# Patient Record
Sex: FEMALE | Race: WHITE | NOT HISPANIC OR LATINO | Employment: FULL TIME | URBAN - METROPOLITAN AREA
[De-identification: names, ages, dates, MRNs, and addresses within clinical notes are randomized per-mention and may not be internally consistent; named-entity substitution may affect disease eponyms.]

---

## 2017-06-19 ENCOUNTER — ALLSCRIPTS OFFICE VISIT (OUTPATIENT)
Dept: OTHER | Facility: OTHER | Age: 27
End: 2017-06-19

## 2017-07-25 ENCOUNTER — ALLSCRIPTS OFFICE VISIT (OUTPATIENT)
Dept: OTHER | Facility: OTHER | Age: 27
End: 2017-07-25

## 2017-08-15 ENCOUNTER — ALLSCRIPTS OFFICE VISIT (OUTPATIENT)
Dept: OTHER | Facility: OTHER | Age: 27
End: 2017-08-15

## 2017-12-26 ENCOUNTER — LAB REQUISITION (OUTPATIENT)
Dept: LAB | Facility: HOSPITAL | Age: 27
End: 2017-12-26
Payer: COMMERCIAL

## 2017-12-26 ENCOUNTER — ALLSCRIPTS OFFICE VISIT (OUTPATIENT)
Dept: OTHER | Facility: OTHER | Age: 27
End: 2017-12-26

## 2017-12-26 DIAGNOSIS — J02.9 ACUTE PHARYNGITIS: ICD-10-CM

## 2017-12-26 LAB — S PYO AG THROAT QL: NEGATIVE

## 2017-12-26 PROCEDURE — 87070 CULTURE OTHR SPECIMN AEROBIC: CPT | Performed by: PHYSICIAN ASSISTANT

## 2017-12-28 LAB — BACTERIA THROAT CULT: NORMAL

## 2018-01-12 VITALS
SYSTOLIC BLOOD PRESSURE: 110 MMHG | WEIGHT: 189 LBS | DIASTOLIC BLOOD PRESSURE: 70 MMHG | TEMPERATURE: 98 F | OXYGEN SATURATION: 100 % | HEART RATE: 88 BPM | BODY MASS INDEX: 32.27 KG/M2 | RESPIRATION RATE: 18 BRPM | HEIGHT: 64 IN

## 2018-01-13 VITALS
HEIGHT: 64 IN | SYSTOLIC BLOOD PRESSURE: 136 MMHG | WEIGHT: 191.38 LBS | DIASTOLIC BLOOD PRESSURE: 78 MMHG | BODY MASS INDEX: 32.67 KG/M2

## 2018-01-13 VITALS
SYSTOLIC BLOOD PRESSURE: 106 MMHG | WEIGHT: 186.2 LBS | DIASTOLIC BLOOD PRESSURE: 64 MMHG | HEIGHT: 64 IN | BODY MASS INDEX: 31.79 KG/M2

## 2018-01-23 VITALS
RESPIRATION RATE: 18 BRPM | SYSTOLIC BLOOD PRESSURE: 120 MMHG | DIASTOLIC BLOOD PRESSURE: 80 MMHG | HEIGHT: 64 IN | TEMPERATURE: 97.4 F | HEART RATE: 86 BPM | BODY MASS INDEX: 32.28 KG/M2 | OXYGEN SATURATION: 99 % | WEIGHT: 189.06 LBS

## 2018-10-15 ENCOUNTER — OFFICE VISIT (OUTPATIENT)
Dept: GYNECOLOGY | Facility: CLINIC | Age: 28
End: 2018-10-15
Payer: COMMERCIAL

## 2018-10-15 VITALS
RESPIRATION RATE: 16 BRPM | BODY MASS INDEX: 32.74 KG/M2 | DIASTOLIC BLOOD PRESSURE: 72 MMHG | SYSTOLIC BLOOD PRESSURE: 124 MMHG | WEIGHT: 191.8 LBS | HEART RATE: 96 BPM | HEIGHT: 64 IN

## 2018-10-15 DIAGNOSIS — N76.6 ULCERATION OF VULVA: Primary | ICD-10-CM

## 2018-10-15 DIAGNOSIS — N76.2 ACUTE VULVITIS: ICD-10-CM

## 2018-10-15 DIAGNOSIS — N76.6 ULCERATION OF VULVA: ICD-10-CM

## 2018-10-15 DIAGNOSIS — N89.8 LEUKORRHEA: ICD-10-CM

## 2018-10-15 PROCEDURE — 99213 OFFICE O/P EST LOW 20 MIN: CPT | Performed by: NURSE PRACTITIONER

## 2018-10-15 PROCEDURE — 87210 SMEAR WET MOUNT SALINE/INK: CPT | Performed by: NURSE PRACTITIONER

## 2018-10-15 NOTE — PROGRESS NOTES
Assessment/Plan:     Tub soak as needed  Rinse urine from skin  Lidocaine rx sent to pharmacy; use as directed  RTO in 4 days, call with any worsening of symptoms  HSV culture done      Diagnoses and all orders for this visit:    Ulceration of vulva  -     lidocaine (XYLOCAINE) 2 % topical gel; Apply 1 application topically 4 (four) times a day    Acute vulvitis    Leukorrhea  -     POCT wet mount    Other orders  -     levonorgestrel (MIRENA, 52 MG,) 20 MCG/24HR IUD; by Intrauterine route  -     Ibuprofen (IBU PO); Take by mouth              Subjective:      Patient ID: Adams Maldonado is a 34 y o  female  Adams Maldonado is a 34 y o  female who is here today for a problem visit  Started with vulvar discomfort 2 days ago and as of yesterday it worsened and now "looks like I have small cuts" on her vulva  Used a new body wash (Ivory Original Scent) starting one week ago  Psoriasis has worsened over the last 3-4 weeks  No follow up and using a cortisone cream to her psoriasis with some improvement  No follow up with a dermatologist  (Never formally diagnosed ) No monthly menses with Mirena IUD  Adams Maldonado is sexually active with male partner of 7 years  No new products for him  Denies new medications or foods  Light white vaginal discharge that she believes is normal  A cool wash cloth lessens the pain  Tub soaks and sitting aggravates the pain  The following portions of the patient's history were reviewed and updated as appropriate: allergies, current medications, past family history, past medical history, past social history, past surgical history and problem list     Review of Systems   Constitutional: Negative  Gastrointestinal: Negative for constipation, diarrhea, nausea and vomiting  Genitourinary: Positive for genital sores  Negative for difficulty urinating, dyspareunia, dysuria, frequency, menstrual problem, pelvic pain, vaginal bleeding, vaginal discharge and vaginal pain     Skin: Negative  See HPI for vulvar skin description   Neurological: Negative for weakness and light-headedness  Hematological: Negative for adenopathy  Psychiatric/Behavioral: Negative  Objective:      /72 (BP Location: Right arm, Patient Position: Sitting, Cuff Size: Standard)   Pulse 96   Resp 16   Ht 5' 4" (1 626 m)   Wt 87 kg (191 lb 12 8 oz)   BMI 32 92 kg/m²          Physical Exam   Constitutional: She is oriented to person, place, and time  She appears well-developed and well-nourished  HENT:   Mouth/Throat: Oropharynx is clear and moist    Abdominal: Soft  Genitourinary: Vagina normal  Rectal exam shows no external hemorrhoid  No labial fusion  There is rash, tenderness and lesion on the right labia  There is no injury on the right labia  There is rash, tenderness and lesion on the left labia  There is no injury on the left labia  Cervix exhibits no motion tenderness, no discharge and no friability  Genitourinary Comments: Multiple  >15, tender shallow based ulcers noted scattered on labia majora and minora   Musculoskeletal: Normal range of motion  Lymphadenopathy:        Right: No inguinal adenopathy present  Left: No inguinal adenopathy present  Neurological: She is alert and oriented to person, place, and time  Skin: Skin is warm and dry  Psychiatric:   anxious   Nursing note and vitals reviewed

## 2018-10-15 NOTE — TELEPHONE ENCOUNTER
Patient called stating [facility] does not have the medication in stock  She called the [facility] on [facility] in [place], and they do have it  Requesting we send the script there

## 2018-10-15 NOTE — PATIENT INSTRUCTIONS
Tub soak as needed  Rinse urine from skin  Lidocaine rx sent to pharmacy; use as directed  RTO in 4 days, call with any worsening of symptoms  HSV culture done

## 2018-10-18 NOTE — PROGRESS NOTES
Assessment/Plan:   Bathe daily with dove bar soap  Keep vulva clean and avoid touching  Gloves given to apply medication as directed  Will call when culture results are available  Call office with any worsening of symptoms  Rinse urine off vulva post urination  Diagnoses and all orders for this visit:    Acute vulvitis  -     nystatin-triamcinolone (MYCOLOG-II) ointment; Apply topically 2 (two) times a day    Ulceration of vulva              Subjective:      Patient ID: Torey Green is a 34 y o  female  Torey Green is a 34 y o  female who is here today for a problem visit  She was seen here on 10/15/18 for vulvar pain with 15 small shallow based ulcers noted scattered on vulva  Used lidocaine gel as ordered  Waiting on culture results  States vulvar pain has lessened  Slight burning of vulva with voiding but even this pain has lessened  Believes here psoriasis is "acting up  "Not sexually active since this occurred  No fever, abd pain,body aches or sore throat  The following portions of the patient's history were reviewed and updated as appropriate: allergies, current medications, past family history, past medical history, past social history, past surgical history and problem list     Review of Systems   Constitutional: Negative  HENT: Negative for mouth sores, sore throat and trouble swallowing  Gastrointestinal: Negative  Genitourinary: Positive for genital sores  Negative for decreased urine volume, dysuria, frequency, menstrual problem, pelvic pain, vaginal discharge and vaginal pain  Skin: Negative  Neurological: Negative for dizziness and light-headedness  Hematological: Negative for adenopathy  Psychiatric/Behavioral: Negative            Objective:      /68 (BP Location: Right arm, Patient Position: Sitting)   Pulse 77   Ht 5' 4" (1 626 m)   Wt 86 5 kg (190 lb 12 8 oz)   LMP  (LMP Unknown)   BMI 32 75 kg/m²          Physical Exam   Constitutional: She is oriented to person, place, and time  She appears well-developed and well-nourished  Genitourinary: Rectal exam shows no external hemorrhoid  No labial fusion  There is tenderness and lesion on the right labia  There is no rash or injury on the right labia  There is tenderness and lesion on the left labia  There is no rash or injury on the left labia  Genitourinary Comments: Multiple shallow based ulcers scattered on labia majora and minora appear to be healing; 1 cm left groin linear laceration noted; erythema of labia minora, majora and hearts line with distinct line up to mons  Satellite lesions noted on mons  Musculoskeletal: Normal range of motion  Lymphadenopathy:        Right: No inguinal adenopathy present  Left: No inguinal adenopathy present  Neurological: She is alert and oriented to person, place, and time  Skin: Skin is warm and dry  Psychiatric: She has a normal mood and affect  Nursing note and vitals reviewed

## 2018-10-19 ENCOUNTER — OFFICE VISIT (OUTPATIENT)
Dept: GYNECOLOGY | Facility: CLINIC | Age: 28
End: 2018-10-19
Payer: COMMERCIAL

## 2018-10-19 VITALS
BODY MASS INDEX: 32.58 KG/M2 | SYSTOLIC BLOOD PRESSURE: 122 MMHG | WEIGHT: 190.8 LBS | HEART RATE: 77 BPM | HEIGHT: 64 IN | DIASTOLIC BLOOD PRESSURE: 68 MMHG

## 2018-10-19 DIAGNOSIS — N76.6 ULCERATION OF VULVA: ICD-10-CM

## 2018-10-19 DIAGNOSIS — N76.2 ACUTE VULVITIS: Primary | ICD-10-CM

## 2018-10-19 PROCEDURE — 99213 OFFICE O/P EST LOW 20 MIN: CPT | Performed by: NURSE PRACTITIONER

## 2018-10-19 RX ORDER — NYSTATIN AND TRIAMCINOLONE ACETONIDE 100000; 1 [USP'U]/G; MG/G
OINTMENT TOPICAL 2 TIMES DAILY
Qty: 30 G | Refills: 0 | Status: SHIPPED | OUTPATIENT
Start: 2018-10-19 | End: 2018-10-31

## 2018-10-19 NOTE — PATIENT INSTRUCTIONS
Bathe daily with dove bar soap  Keep vulva clean and avoid touching  Gloves given to apply medication as directed  Will call when culture results are available  Call office with any worsening of symptoms  Rinse urine off vulva post urination

## 2018-10-22 ENCOUNTER — OFFICE VISIT (OUTPATIENT)
Dept: GYNECOLOGY | Facility: CLINIC | Age: 28
End: 2018-10-22
Payer: COMMERCIAL

## 2018-10-22 VITALS
WEIGHT: 188.8 LBS | DIASTOLIC BLOOD PRESSURE: 72 MMHG | HEIGHT: 64 IN | SYSTOLIC BLOOD PRESSURE: 134 MMHG | HEART RATE: 114 BPM | RESPIRATION RATE: 16 BRPM | BODY MASS INDEX: 32.23 KG/M2

## 2018-10-22 DIAGNOSIS — Z11.3 ROUTINE SCREENING FOR STI (SEXUALLY TRANSMITTED INFECTION): ICD-10-CM

## 2018-10-22 DIAGNOSIS — Z86.19 HISTORY OF PCR DNA POSITIVE FOR HSV1: Primary | ICD-10-CM

## 2018-10-22 LAB
HSV1 DNA SPEC QL NAA+PROBE: DETECTED
HSV2 DNA SPEC QL NAA+PROBE: NOT DETECTED
VZV DNA SPEC QL NAA+PROBE: NOT DETECTED

## 2018-10-22 PROCEDURE — 99213 OFFICE O/P EST LOW 20 MIN: CPT | Performed by: NURSE PRACTITIONER

## 2018-10-22 RX ORDER — VALACYCLOVIR HYDROCHLORIDE 500 MG/1
500 TABLET, FILM COATED ORAL 2 TIMES DAILY
Qty: 6 TABLET | Refills: 5 | Status: SHIPPED | OUTPATIENT
Start: 2018-10-22 | End: 2018-10-31

## 2018-10-22 NOTE — PATIENT INSTRUCTIONS
HSV-1 has less recurrences then HSV-2  You are positive for type 1  Avoid sex during outbreaks or symptoms  Viral shedding may occur in the absence of an outbreak  Discussed partner notification, episodic and suppressive therapy  Suppressive therapy is needed the last trimester of pregnancy

## 2018-10-22 NOTE — PROGRESS NOTES
Assessment/Plan:     HSV-1 has less recurrences then HSV-2  You are positive for type 1  Avoid sex during outbreaks or symptoms  Viral shedding may occur in the absence of an outbreak  Discussed partner notification, episodic and suppressive therapy  Suppressive therapy is needed the last trimester of pregnancy  Rx given for valtrex  Diagnoses and all orders for this visit:    History of PCR DNA positive for HSV1  -     valACYclovir (VALTREX) 500 mg tablet; Take 1 tablet (500 mg total) by mouth 2 (two) times a day for 3 days    Routine screening for STI (sexually transmitted infection)  -     Hepatitis C antibody; Future  -     HIV 1/2 AG-AB combo; Future  -     RPR; Future              Subjective:      Patient ID: Bree Bustillos is a 34 y o  female  Bree Bustillos is a 34 y o  female who is here today for test results  She presented tot this office with multiple shallow based vulvar ulcer on 10/15/18  She tested positive for HSV1 by PCR testing  Bree Bustillos is sexually active with male partner of 7 years  Monogamous  Her vulva has improved significantly with less pain and resolution of the ulcers  The following portions of the patient's history were reviewed and updated as appropriate: allergies, current medications, past family history, past medical history, past social history, past surgical history and problem list     Review of Systems   Constitutional: Negative  HENT: Negative for sore throat and trouble swallowing  Gastrointestinal: Negative for abdominal pain, constipation, diarrhea, nausea and vomiting  Genitourinary: Negative for dyspareunia, dysuria, genital sores, menstrual problem, pelvic pain, vaginal bleeding, vaginal discharge and vaginal pain  Musculoskeletal: Negative for arthralgias and myalgias  Skin: Negative  Hematological: Negative for adenopathy  Psychiatric/Behavioral: Negative  All other systems reviewed and are negative  Objective:      /72 (BP Location: Right arm, Patient Position: Sitting, Cuff Size: Standard)   Pulse (!) 114   Resp 16   Ht 5' 4" (1 626 m)   Wt 85 6 kg (188 lb 12 8 oz)   LMP  (LMP Unknown)   BMI 32 41 kg/m²          Physical Exam   Constitutional: She is oriented to person, place, and time  Neurological: She is alert and oriented to person, place, and time  Psychiatric: She has a normal mood and affect  Nursing note and vitals reviewed      deferred otherwise

## 2018-10-30 PROBLEM — Z00.00 WELL ADULT EXAM: Status: ACTIVE | Noted: 2018-10-30

## 2018-10-31 ENCOUNTER — OFFICE VISIT (OUTPATIENT)
Dept: FAMILY MEDICINE CLINIC | Facility: CLINIC | Age: 28
End: 2018-10-31
Payer: COMMERCIAL

## 2018-10-31 VITALS
HEIGHT: 64 IN | WEIGHT: 188 LBS | DIASTOLIC BLOOD PRESSURE: 70 MMHG | SYSTOLIC BLOOD PRESSURE: 110 MMHG | OXYGEN SATURATION: 98 % | RESPIRATION RATE: 18 BRPM | TEMPERATURE: 98 F | BODY MASS INDEX: 32.1 KG/M2 | HEART RATE: 88 BPM

## 2018-10-31 DIAGNOSIS — Z00.00 WELL ADULT EXAM: Primary | ICD-10-CM

## 2018-10-31 PROBLEM — N76.2 ACUTE VULVITIS: Status: RESOLVED | Noted: 2018-10-15 | Resolved: 2018-10-31

## 2018-10-31 PROBLEM — N76.6 ULCERATION OF VULVA: Status: RESOLVED | Noted: 2018-10-15 | Resolved: 2018-10-31

## 2018-10-31 PROCEDURE — 99395 PREV VISIT EST AGE 18-39: CPT | Performed by: PHYSICIAN ASSISTANT

## 2018-10-31 PROCEDURE — 3725F SCREEN DEPRESSION PERFORMED: CPT | Performed by: PHYSICIAN ASSISTANT

## 2018-10-31 NOTE — PROGRESS NOTES
Assessment/Plan:    Patient Instructions   School physical form completed  Routine physical in 1 year           Diagnoses and all orders for this visit:    Well adult exam          Subjective:      Patient ID: Sorin Cordova is a 34 y o  female  Patient presents today for routine physical for employment at [employer]  She has no concerns at this time  She does not have her immunization records  She does follow with gynecology routinely  Denies any the medical conditions listed on the school form  The following portions of the patient's history were reviewed and updated as appropriate:   She  has a past medical history of Psoriasis  She   Patient Active Problem List    Diagnosis Date Noted    Well adult exam 10/30/2018    History of PCR DNA positive for HSV1 10/22/2018    Routine screening for STI (sexually transmitted infection) 10/22/2018    Panic attacks 08/06/2014     She  has a past surgical history that includes Cholecystectomy and [place] tooth extraction  Her family history includes Arthritis in her mother; Asthma in her family and mother; Diabetes in her maternal grandmother; Hypertension in her mother; No Known Problems in her brother, brother, daughter, sister, and sister; Other in her maternal grandfather; Thyroid disease unspecified in her maternal grandmother and mother  She  reports that she has quit smoking  She has never used smokeless tobacco  She reports that she drinks alcohol  She reports that she does not use drugs  Current Outpatient Prescriptions   Medication Sig Dispense Refill    levonorgestrel (MIRENA, 52 MG,) 20 MCG/24HR IUD by Intrauterine route       No current facility-administered medications for this visit        Current Outpatient Prescriptions on File Prior to Visit   Medication Sig    levonorgestrel (MIRENA, 52 MG,) 20 MCG/24HR IUD by Intrauterine route    [DISCONTINUED] Ibuprofen (IBU PO) Take by mouth    [DISCONTINUED] nystatin-triamcinolone (MYCOLOG-II) ointment Apply topically 2 (two) times a day    [DISCONTINUED] valACYclovir (VALTREX) 500 mg tablet Take 1 tablet (500 mg total) by mouth 2 (two) times a day for 3 days     No current facility-administered medications on file prior to visit  She has No Known Allergies       Review of Systems      Objective:      /70   Pulse 88   Temp 98 °F (36 7 °C) (Tympanic)   Resp 18   Ht 5' 4" (1 626 m)   Wt 85 3 kg (188 lb)   LMP  (LMP Unknown)   SpO2 98%   BMI 32 27 kg/m²          Physical Exam   Constitutional: She appears well-developed and well-nourished  No distress  HENT:   Head: Normocephalic and atraumatic  Right Ear: External ear normal    Left Ear: External ear normal    Nose: Nose normal    Mouth/Throat: Oropharynx is clear and moist    Hearing screening normal with 6 feet whisper bilaterally   Eyes: Right eye exhibits no discharge  Left eye exhibits no discharge  Neck: Neck supple  No thyromegaly present  Cardiovascular: Normal rate, regular rhythm and normal heart sounds  Exam reveals no gallop and no friction rub  No murmur heard  Pulmonary/Chest: Effort normal and breath sounds normal  No respiratory distress  She has no wheezes  She has no rales  Abdominal: Soft  Bowel sounds are normal  She exhibits no mass  There is no tenderness  Musculoskeletal: Normal range of motion  She exhibits no deformity  Lymphadenopathy:     She has no cervical adenopathy  Neurological: She is alert  Skin: Skin is warm  Psychiatric: She has a normal mood and affect

## 2019-09-25 ENCOUNTER — OFFICE VISIT (OUTPATIENT)
Dept: FAMILY MEDICINE CLINIC | Facility: CLINIC | Age: 29
End: 2019-09-25

## 2019-09-25 VITALS
TEMPERATURE: 97.4 F | HEART RATE: 93 BPM | BODY MASS INDEX: 31.58 KG/M2 | OXYGEN SATURATION: 98 % | HEIGHT: 64 IN | RESPIRATION RATE: 18 BRPM | SYSTOLIC BLOOD PRESSURE: 118 MMHG | DIASTOLIC BLOOD PRESSURE: 70 MMHG | WEIGHT: 185 LBS

## 2019-09-25 DIAGNOSIS — Z11.1 SCREENING FOR TUBERCULOSIS: ICD-10-CM

## 2019-09-25 DIAGNOSIS — Z02.89 ENCOUNTER FOR PHYSICAL EXAMINATION RELATED TO EMPLOYMENT: Primary | ICD-10-CM

## 2019-09-25 PROCEDURE — 3008F BODY MASS INDEX DOCD: CPT | Performed by: PHYSICIAN ASSISTANT

## 2019-09-25 PROCEDURE — 1036F TOBACCO NON-USER: CPT | Performed by: PHYSICIAN ASSISTANT

## 2019-09-25 PROCEDURE — 99213 OFFICE O/P EST LOW 20 MIN: CPT | Performed by: PHYSICIAN ASSISTANT

## 2019-09-25 PROCEDURE — 86580 TB INTRADERMAL TEST: CPT

## 2019-09-25 RX ORDER — IBUPROFEN 600 MG/1
600 TABLET ORAL AS NEEDED
COMMUNITY
Start: 2019-03-24 | End: 2019-09-25 | Stop reason: ALTCHOICE

## 2019-09-25 RX ORDER — MECLIZINE HYDROCHLORIDE 25 MG/1
25 TABLET ORAL 3 TIMES DAILY PRN
COMMUNITY
Start: 2017-03-21 | End: 2019-09-25 | Stop reason: ALTCHOICE

## 2019-09-25 NOTE — PROGRESS NOTES
Assessment/Plan:    Employment physical  - Patient is doing well, no complaints  Employment physical completed pending PPD results  Will have patient return to clinic in 2 days for PPD reading  Diagnoses and all orders for this visit:    Encounter for physical examination related to employment    Screening for tuberculosis  -     TB Skin Test    All of patients questions were answered  Patient understands and agrees with the above plan  Return in about 1 year (around 9/25/2020) for Annual physical; RTC in 2 days for PPD reading with nurse visit  Jo Tatum PA-C  09/25/19  [facility]           Subjective:     Patient ID: Janey Ryan  is a 34 y o  female with no known PMH who presents today in office for employment physical      - Patient is a 34 y o  female who presents today for employment physical    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  Patient requires to have an appointment physical completed which requires a TB skin test   Patient currently does not take any medications, vitamins, or supplements  Patient denies any known allergies  Patient notes she has a history of gallbladder surgery about 5 years ago and also had her wisdom teeth removed  Patient denies any tobacco use or drug use including marijuana  Patient notes she does drink alcohol socially  Patient wears glasses for reading, but does not follow regularly with an eye doctor  Patient notes she eats and drinks well  Patient notes she sleeps well, without any difficulty falling or staying asleep  Patient notes she does not perform regular exercise, but she does walk her dog  Patient notes she does follow with Ob/gyn for annual well-woman gynecological exams  Patient denies any history of abnormal Paps  Patient currently has IUD- Mirena  Patient notes she does not receive her period when she has the IUD   Overall, patient notes she is doing well and has no complaints today  The following portions of the patient's history were reviewed and updated as appropriate: allergies, current medications, past family history, past medical history, past social history, past surgical history and problem list         Review of Systems   Constitutional: Negative for appetite change, fatigue and fever  HENT: Negative for congestion, ear pain, rhinorrhea and sore throat  Eyes: Negative for pain  Respiratory: Negative for cough, chest tightness and shortness of breath  Cardiovascular: Negative for chest pain, palpitations and leg swelling  Gastrointestinal: Negative for abdominal pain, constipation, diarrhea, nausea and vomiting  Genitourinary: Negative for difficulty urinating and dysuria  Musculoskeletal: Negative for arthralgias  Skin: Negative for rash  Neurological: Negative for dizziness and headaches  Psychiatric/Behavioral: Negative for behavioral problems  The patient is not nervous/anxious  Objective:   Vitals:    09/25/19 1405   BP: 118/70   BP Location: Right arm   Patient Position: Sitting   Cuff Size: Standard   Pulse: 93   Resp: 18   Temp: (!) 97 4 °F (36 3 °C)   TempSrc: Temporal   SpO2: 98%   Weight: 83 9 kg (185 lb)   Height: 5' 4" (1 626 m)         Physical Exam   Constitutional: She is oriented to person, place, and time  She appears well-developed and well-nourished  No distress  HENT:   Head: Normocephalic and atraumatic  Right Ear: Tympanic membrane, external ear and ear canal normal    Left Ear: Tympanic membrane, external ear and ear canal normal    Nose: Nose normal    Mouth/Throat: Uvula is midline, oropharynx is clear and moist and mucous membranes are normal    Eyes: Pupils are equal, round, and reactive to light  Conjunctivae and EOM are normal    Neck: Normal range of motion  Neck supple  Cardiovascular: Normal rate, regular rhythm, normal heart sounds and intact distal pulses     No murmur heard   Pulmonary/Chest: Effort normal and breath sounds normal  She has no wheezes  Abdominal: Soft  Bowel sounds are normal  There is no tenderness  Musculoskeletal: Normal range of motion  Neurological: She is alert and oriented to person, place, and time  Skin: Skin is warm and dry  Psychiatric: She has a normal mood and affect  Her speech is normal and behavior is normal    Nursing note and vitals reviewed

## 2019-09-27 ENCOUNTER — CLINICAL SUPPORT (OUTPATIENT)
Dept: FAMILY MEDICINE CLINIC | Facility: CLINIC | Age: 29
End: 2019-09-27

## 2019-09-27 DIAGNOSIS — Z11.1 ENCOUNTER FOR PPD SKIN TEST READING: Primary | ICD-10-CM

## 2019-09-27 LAB
INDURATION: 0 MM
TB SKIN TEST: NEGATIVE

## 2020-12-29 ENCOUNTER — NURSE TRIAGE (OUTPATIENT)
Dept: OTHER | Facility: OTHER | Age: 30
End: 2020-12-29

## 2021-02-26 ENCOUNTER — LAB (OUTPATIENT)
Dept: LAB | Facility: HOSPITAL | Age: 31
End: 2021-02-26
Payer: COMMERCIAL

## 2021-02-26 ENCOUNTER — OFFICE VISIT (OUTPATIENT)
Dept: FAMILY MEDICINE CLINIC | Facility: CLINIC | Age: 31
End: 2021-02-26

## 2021-02-26 VITALS
TEMPERATURE: 98.4 F | BODY MASS INDEX: 33.16 KG/M2 | SYSTOLIC BLOOD PRESSURE: 124 MMHG | HEIGHT: 64 IN | WEIGHT: 194.2 LBS | DIASTOLIC BLOOD PRESSURE: 82 MMHG | HEART RATE: 107 BPM | OXYGEN SATURATION: 98 % | RESPIRATION RATE: 18 BRPM

## 2021-02-26 DIAGNOSIS — M25.50 ARTHRALGIA, UNSPECIFIED JOINT: ICD-10-CM

## 2021-02-26 DIAGNOSIS — Z13.31 DEPRESSION SCREEN: ICD-10-CM

## 2021-02-26 DIAGNOSIS — Z00.00 HEALTHCARE MAINTENANCE: ICD-10-CM

## 2021-02-26 DIAGNOSIS — L40.9 PSORIASIS: ICD-10-CM

## 2021-02-26 DIAGNOSIS — M25.50 ARTHRALGIA, UNSPECIFIED JOINT: Primary | ICD-10-CM

## 2021-02-26 DIAGNOSIS — E66.3 OVERWEIGHT: ICD-10-CM

## 2021-02-26 LAB
ALBUMIN SERPL BCP-MCNC: 4.7 G/DL (ref 3–5.2)
ALP SERPL-CCNC: 80 U/L (ref 43–122)
ALT SERPL W P-5'-P-CCNC: 27 U/L (ref 9–52)
ANION GAP SERPL CALCULATED.3IONS-SCNC: 12 MMOL/L (ref 5–14)
AST SERPL W P-5'-P-CCNC: 25 U/L (ref 14–36)
BASOPHILS # BLD AUTO: 0.1 THOUSANDS/ΜL (ref 0–0.1)
BASOPHILS NFR BLD AUTO: 1 % (ref 0–1)
BILIRUB SERPL-MCNC: 0.6 MG/DL
BUN SERPL-MCNC: 15 MG/DL (ref 5–25)
CALCIUM SERPL-MCNC: 9.7 MG/DL (ref 8.4–10.2)
CHLORIDE SERPL-SCNC: 103 MMOL/L (ref 97–108)
CO2 SERPL-SCNC: 25 MMOL/L (ref 22–30)
CREAT SERPL-MCNC: 0.71 MG/DL (ref 0.6–1.2)
CRP SERPL QL: 14.2 MG/L
EOSINOPHIL # BLD AUTO: 0.1 THOUSAND/ΜL (ref 0–0.4)
EOSINOPHIL NFR BLD AUTO: 1 % (ref 0–6)
ERYTHROCYTE [DISTWIDTH] IN BLOOD BY AUTOMATED COUNT: 12.8 %
ERYTHROCYTE [SEDIMENTATION RATE] IN BLOOD: 27 MM/HOUR (ref 0–19)
GFR SERPL CREATININE-BSD FRML MDRD: 116 ML/MIN/1.73SQ M
GLUCOSE P FAST SERPL-MCNC: 95 MG/DL (ref 70–99)
HCT VFR BLD AUTO: 38.7 % (ref 36–46)
HGB BLD-MCNC: 12.6 G/DL (ref 12–16)
LYMPHOCYTES # BLD AUTO: 3.3 THOUSANDS/ΜL (ref 0.5–4)
LYMPHOCYTES NFR BLD AUTO: 24 % (ref 25–45)
MCH RBC QN AUTO: 29.8 PG (ref 26–34)
MCHC RBC AUTO-ENTMCNC: 32.5 G/DL (ref 31–36)
MCV RBC AUTO: 92 FL (ref 80–100)
MONOCYTES # BLD AUTO: 0.8 THOUSAND/ΜL (ref 0.2–0.9)
MONOCYTES NFR BLD AUTO: 6 % (ref 1–10)
NEUTROPHILS # BLD AUTO: 9.4 THOUSANDS/ΜL (ref 1.8–7.8)
NEUTS SEG NFR BLD AUTO: 69 % (ref 45–65)
PLATELET # BLD AUTO: 353 THOUSANDS/UL (ref 150–450)
PMV BLD AUTO: 8.8 FL (ref 8.9–12.7)
POTASSIUM SERPL-SCNC: 3.7 MMOL/L (ref 3.6–5)
PROT SERPL-MCNC: 8.7 G/DL (ref 5.9–8.4)
RBC # BLD AUTO: 4.21 MILLION/UL (ref 4–5.2)
SODIUM SERPL-SCNC: 140 MMOL/L (ref 137–147)
WBC # BLD AUTO: 13.7 THOUSAND/UL (ref 4.5–11)

## 2021-02-26 PROCEDURE — 99213 OFFICE O/P EST LOW 20 MIN: CPT | Performed by: PHYSICIAN ASSISTANT

## 2021-02-26 PROCEDURE — 86038 ANTINUCLEAR ANTIBODIES: CPT

## 2021-02-26 PROCEDURE — 86140 C-REACTIVE PROTEIN: CPT

## 2021-02-26 PROCEDURE — 86039 ANTINUCLEAR ANTIBODIES (ANA): CPT

## 2021-02-26 PROCEDURE — 86618 LYME DISEASE ANTIBODY: CPT

## 2021-02-26 PROCEDURE — 86430 RHEUMATOID FACTOR TEST QUAL: CPT

## 2021-02-26 PROCEDURE — 85025 COMPLETE CBC W/AUTO DIFF WBC: CPT

## 2021-02-26 PROCEDURE — 85652 RBC SED RATE AUTOMATED: CPT

## 2021-02-26 PROCEDURE — 86200 CCP ANTIBODY: CPT

## 2021-02-26 PROCEDURE — 36415 COLL VENOUS BLD VENIPUNCTURE: CPT

## 2021-02-26 PROCEDURE — 80053 COMPREHEN METABOLIC PANEL: CPT

## 2021-02-26 RX ORDER — IBUPROFEN 200 MG
TABLET ORAL EVERY 6 HOURS PRN
COMMUNITY

## 2021-02-26 RX ORDER — ACETAMINOPHEN 500 MG
500 TABLET ORAL EVERY 6 HOURS PRN
COMMUNITY

## 2021-02-26 NOTE — PROGRESS NOTES
Assessment/Plan:    Psoriasis  - Recently rash has been increasing to multiple locations including scalp, elbows, knees  - Will prescribe Kenalog 0 1% ointment, to be applied twice daily to the affected areas  Arthralgia  - Patient has been experiencing diffuse joint pain for about 6 months now  Unclear etiology at this time  - Differential includes psoriatic arthritis, rheumatoid arthritis, Lyme disease, osteoarthritis  - Will order CBC, CMP, CRP, ESR, anti CCP, Lyme antibody, rheumatoid factor, SHANNON to further evaluate for cause of diffuse joint pain  Depression Screening Follow-up Plan: Patient's depression screening was positive with a PHQ-2 score of 0  Their PHQ-9 score was not indicated  Clinically patient does not have depression  No treatment is required  Diagnoses and all orders for this visit:    Arthralgia, unspecified joint  -     Sedimentation rate, automated; Future  -     C-reactive protein; Future  -     Lyme Antibody Profile with reflex to WB; Future  -     SHANNON Screen w/ Reflex to Titer/Pattern; Future  -     RF Screen w/ Reflex to Titer; Future  -     Cyclic citrul peptide antibody, IgG; Future    Psoriasis  -     triamcinolone (KENALOG) 0 1 % ointment; Apply topically 2 (two) times a day    Depression screen    Healthcare maintenance  -     CBC and differential; Future  -     Comprehensive metabolic panel; Future    Overweight    Other orders  -     ibuprofen (MOTRIN) 200 mg tablet; Take by mouth every 6 (six) hours as needed for mild pain  -     acetaminophen (TYLENOL) 500 mg tablet; Take 500 mg by mouth every 6 (six) hours as needed for mild pain          All of patients questions were answered  Patient understands and agrees with the above plan  Return as needed  Migel Baxter PA-C  02/26/21  [facility]           Subjective:     Patient ID: Hugo Amato  is a 31 y o  female  has a past medical history of Psoriasis   who presents today in office for joint pain     - Patient is a 31 y o  female who presents today for joint pain  Patient notes for the past 6 months or so she has been experiencing diffuse joint pain, specifically of bilateral shoulders, elbows, wrists, hands, knees, ankles  Patient notes the pain is the worst in her wrists / hands and ankles  Patient admits to associated swelling of bilateral wrists, hands, fingers, and ankles  Patient denies any known injury or trauma to any of these areas  Patient notes she has constant pain  Patient notes it is now often difficult to grasp things with both hands  Patient notes she is unable to put shoes on by herself and cannot button her shirt or jeans on her own due to the pain and swelling of her fingers  Patient notes her joints are often very stiff, particularly in the morning  Patient notes she has been taking Tylenol and ibuprofen as needed for the pain and swelling  Patient notes she does not like to take the medication every day, but sometimes she has to  Patient denies any fevers, chills, shortness of breath, difficulty breathing, nausea, vomiting, diarrhea, constipation, sore throat  Patient notes she does have some fatigue, but she believes it is her usual fatigue  Patient notes she has a family history of arthritis, but is unsure of family history a specific autoimmune disease  Patient notes she does have psoriasis and usually she only has a small patch located on the back of her head, but recently she has been experiencing psoriasis patches of other areas of her body  Patient notes she currently has a patch of her left elbow and sometimes of bilateral knees  Patient has not been placing any cream on it  Patient notes she has never seen a specialist for because it was never that bad  - Of note, patient notes she tested positive for COVID-19 about 1 5 months ago  Patient notes she had 1 day where she had a lot of symptoms, but otherwise she was okay        The following portions of the patient's history were reviewed and updated as appropriate: allergies, current medications, past family history, past medical history, past social history, past surgical history and problem list         Review of Systems   Constitutional: Positive for fatigue  Negative for appetite change, chills and fever  HENT: Negative for congestion, ear pain, rhinorrhea and sore throat  Eyes: Negative for pain  Respiratory: Negative for cough, chest tightness and shortness of breath  Cardiovascular: Negative for chest pain and palpitations  Gastrointestinal: Negative for abdominal pain, constipation, diarrhea, nausea and vomiting  Genitourinary: Negative for difficulty urinating and dysuria  Musculoskeletal: Positive for arthralgias, back pain and joint swelling  Skin: Negative for rash  Neurological: Negative for dizziness and headaches  Psychiatric/Behavioral: The patient is not nervous/anxious  BMI Counseling: Body mass index is 33 33 kg/m²  The BMI is above normal  Nutrition recommendations include encouraging healthy choices of fruits and vegetables and consuming healthier snacks  Exercise recommendations include moderate physical activity 150 minutes/week  No pharmacotherapy was ordered  Objective:   Vitals:    02/26/21 1353   BP: 124/82   BP Location: Right arm   Patient Position: Sitting   Cuff Size: Adult   Pulse: (!) 107   Resp: 18   Temp: 98 4 °F (36 9 °C)   TempSrc: Temporal   SpO2: 98%   Weight: 88 1 kg (194 lb 3 2 oz)   Height: 5' 4" (1 626 m)         Physical Exam  Vitals signs and nursing note reviewed  Constitutional:       Appearance: She is well-developed  HENT:      Head: Normocephalic and atraumatic  Right Ear: External ear normal       Left Ear: External ear normal       Nose: Nose normal    Eyes:      Conjunctiva/sclera: Conjunctivae normal    Neck:      Musculoskeletal: Normal range of motion and neck supple     Cardiovascular:      Rate and Rhythm: Regular rhythm  Tachycardia present  Heart sounds: Normal heart sounds  No murmur  Pulmonary:      Effort: Pulmonary effort is normal       Breath sounds: Normal breath sounds  No wheezing  Musculoskeletal:      Right wrist: She exhibits tenderness and swelling  She exhibits normal range of motion  Left wrist: She exhibits tenderness and swelling  She exhibits normal range of motion  Right ankle: She exhibits swelling  She exhibits normal range of motion  Tenderness  Lateral malleolus tenderness found  Left ankle: She exhibits swelling  She exhibits normal range of motion  Tenderness  Right hand: She exhibits decreased range of motion, tenderness and swelling  Normal sensation noted  Decreased strength (d/t pain) noted  Left hand: She exhibits decreased range of motion, tenderness and swelling  Normal sensation noted  Decreased strength (d/t pain) noted  Skin:     General: Skin is warm and dry  Comments: Psoriatic patch noted of posterior scalp and extensor surface of left elbow  Neurological:      Mental Status: She is alert and oriented to person, place, and time     Psychiatric:         Mood and Affect: Mood normal          Speech: Speech normal          Behavior: Behavior normal            PHQ-9 Depression Screening    PHQ-9:   Frequency of the following problems over the past two weeks:      Little interest or pleasure in doing things: 0 - not at all  Feeling down, depressed, or hopeless: 0 - not at all  PHQ-2 Score: 0

## 2021-02-26 NOTE — PATIENT INSTRUCTIONS
Psoriasis   WHAT YOU NEED TO KNOW:   Psoriasis is a long-term skin disease in which the skin cells grow faster than normal  This abnormal growth causes a buildup of cells on the surface of the skin  Red, raised patches of skin that are covered with silver-colored scales form on your skin  DISCHARGE INSTRUCTIONS:   Medicines:   · Topical medicine: These medicines can be ointments, creams, and pastes and are applied on the skin  ? Moisturizers: These soothe your skin by keeping it moist and preventing dryness  ? Steroids: This medicine may be given to decrease inflammation  ? Vitamin D and retinoids: These are vitamin-based creams that are used to clear plaques  ? Anthralin:  This medicine decreases swelling and excess skin cells that form scales  ? Salicylic acid: This peeling agent helps decrease scaling of the skin and scalp  ? Tar preparations: These medicines decrease your itching, scaling, and inflammation  They may be shampoos, creams, or bath oils  · Oral medicine: These medicines are used to treat serious types of psoriasis and are taken by mouth  These medicines include steroids or retinoids  They may also include medicines that decrease the rate of growth of your skin cells or that affect your immune system  · Take your medicine as directed  Contact your healthcare provider if you think your medicine is not helping or if you have side effects  Tell him of her if you are allergic to any medicine  Keep a list of the medicines, vitamins, and herbs you take  Include the amounts, and when and why you take them  Bring the list or the pill bottles to follow-up visits  Carry your medicine list with you in case of an emergency  Follow up with your healthcare provider or dermatologist as directed:  Write down your questions so you remember to ask them during your visits    Self-care:   · Take care of your skin:  Apply emollients, lubricants, or moisturizing creams to your skin regularly  Apply these while your skin is still damp when you bathe  Stop using them if they sting or irritate your skin  Use mild soaps and add bath oils to soothe your skin when you bathe  · Protect your skin from sun exposure:  When you get sun exposure for short periods of time, it can help your psoriasis  Too much sun exposure or a sunburn can make your psoriasis worse  Talk to your dermatologist or healthcare provider about how much sun exposure is right for you  · Manage stress:  Stress can trigger a flare-up  Find healthy ways to manage stress, such as deep breathing or meditation  · Watch for symptoms with new medicines or herbal supplements:  Some medicines, including herbal supplements, may trigger a psoriasis flare-up  Ask if any of the medicines you take may be making your psoriasis worse  Always check for skin changes when you take your medicines  · Do not smoke:  Smoking can trigger a flare-up of psoriasis  If you smoke, it is never too late to quit  Ask for information about how to stop  · Avoid triggers:  Injury to the skin, cold weather, and heavy alcohol use are other things that can trigger psoriasis flare-ups  Contact your healthcare provider if:   · You get pregnant  · You have a fever  · Your skin plaques are not getting better or are getting worse  · You cannot sleep because your skin itches  · Your skin plaques have pus draining from them or they have soft yellow scabs  · You have questions or concerns about your condition or care  Return to the emergency department if:   · Psoriasis suddenly covers larger areas of your body and becomes more painful  © Copyright [address] Information is for End User's use only and may not be sold, redistributed or otherwise used for commercial purposes   All illustrations and images included in CareNotes® are the copyrighted property of [employer]  or [employer]  The above information is an  only  It is not intended as medical advice for individual conditions or treatments  Talk to your doctor, nurse or pharmacist before following any medical regimen to see if it is safe and effective for you

## 2021-02-27 LAB — B BURGDOR IGG+IGM SER-ACNC: 17

## 2021-02-28 LAB — CCP IGA+IGG SERPL IA-ACNC: 4 UNITS (ref 0–19)

## 2021-03-01 ENCOUNTER — NURSE TRIAGE (OUTPATIENT)
Dept: OTHER | Facility: OTHER | Age: 31
End: 2021-03-01

## 2021-03-01 LAB — RHEUMATOID FACT SER QL LA: NEGATIVE

## 2021-03-01 NOTE — TELEPHONE ENCOUNTER
Reason for Disposition   [1] SEVERE back pain (e g , excruciating, unable to do any normal activities) AND [2] not improved 2 hours after pain medicine    Answer Assessment - Initial Assessment Questions  1  ONSET: "When did the pain begin?"       Patient stated that she developed joints pain about 6 months ago and back pain about 1,5 month ago   2  LOCATION: "Where does it hurt?" (upper, mid or lower back)      Shoulder blades down to middle back  3  SEVERITY: "How bad is the pain?"  (e g , Scale 1-10; mild, moderate, or severe)    - MILD (1-3): doesn't interfere with normal activities     - MODERATE (4-7): interferes with normal activities or awakens from sleep     - SEVERE (8-10): excruciating pain, unable to do any normal activities       10 out of 10    4  PATTERN: "Is the pain constant?" (e g , yes, no; constant, intermittent)       Constant   5  RADIATION: "Does the pain shoot into your legs or elsewhere?"      Patient stated that "every join in a body hurts"   6  CAUSE:  "What do you think is causing the back pain?"        Arthritis Dx has been pending    7  BACK OVERUSE:  "Any recent lifting of heavy objects, strenuous work or exercise?"      No   8  MEDICATIONS: "What have you taken so far for the pain?" (e g , nothing, acetaminophen, NSAIDS)      Ibuprofen, Tylenol   9  NEUROLOGIC SYMPTOMS: "Do you have any weakness, numbness, or problems with bowel/bladder control?"      No    10  OTHER SYMPTOMS: "Do you have any other symptoms?" (e g , fever, abdominal pain, burning with urination, blood in urine)        Joints pain   11   PREGNANCY: "Is there any chance you are pregnant?" (e g , yes, no; LMP)        IUD    Protocols used: BACK PAIN-ADULT-

## 2021-03-01 NOTE — TELEPHONE ENCOUNTER
Regarding: Extreme joint pain all over body   ----- Message from Juan Guerrero sent at 3/1/2021  5:35 PM EST -----  " I just recently got a blood panel done and a majority of the tests came back already and they are negative for rheumatoid arthritis but my whole body is in extreme pain all of my joints hurt and i'm getting worried I'm not sure if I need to come into the ER to get more tests done  But nothing I do stops the pain and it's getting unbearable"

## 2021-03-01 NOTE — ASSESSMENT & PLAN NOTE
- Recently rash has been increasing to multiple locations including scalp, elbows, knees  - Will prescribe Kenalog 0 1% ointment, to be applied twice daily to the affected areas

## 2021-03-01 NOTE — ASSESSMENT & PLAN NOTE
- Patient has been experiencing diffuse joint pain for about 6 months now  Unclear etiology at this time  - Differential includes psoriatic arthritis, rheumatoid arthritis, Lyme disease, osteoarthritis  - Will order CBC, CMP, CRP, ESR, anti CCP, Lyme antibody, rheumatoid factor, SHANNON to further evaluate for cause of diffuse joint pain

## 2021-03-01 NOTE — TELEPHONE ENCOUNTER
Patient stated that she would like to go to ER now due to severe joints and back pain which is not relieved by NSAID

## 2021-03-02 ENCOUNTER — TELEPHONE (OUTPATIENT)
Dept: FAMILY MEDICINE CLINIC | Facility: CLINIC | Age: 31
End: 2021-03-02

## 2021-03-02 LAB
ANA HOMOGEN SER QL IF: NORMAL
ANA HOMOGEN TITR SER: NORMAL {TITER}
RYE IGE QN: POSITIVE

## 2021-03-02 NOTE — TELEPHONE ENCOUNTER
Patient received lab results on mychart and she is concerned with some of the results   She would like a call back thank you

## 2021-03-03 DIAGNOSIS — L40.9 PSORIASIS: ICD-10-CM

## 2021-03-03 DIAGNOSIS — M25.50 ARTHRALGIA, UNSPECIFIED JOINT: ICD-10-CM

## 2021-03-03 DIAGNOSIS — R76.8 POSITIVE ANA (ANTINUCLEAR ANTIBODY): Primary | ICD-10-CM

## 2021-03-29 ENCOUNTER — HOSPITAL ENCOUNTER (EMERGENCY)
Facility: HOSPITAL | Age: 31
Discharge: HOME | End: 2021-03-29
Attending: EMERGENCY MEDICINE | Admitting: EMERGENCY MEDICINE
Payer: COMMERCIAL

## 2021-03-29 ENCOUNTER — APPOINTMENT (EMERGENCY)
Dept: ULTRASOUND IMAGING | Facility: HOSPITAL | Age: 31
End: 2021-03-29
Payer: COMMERCIAL

## 2021-03-29 VITALS
WEIGHT: 194 LBS | HEIGHT: 64 IN | BODY MASS INDEX: 33.12 KG/M2 | SYSTOLIC BLOOD PRESSURE: 125 MMHG | TEMPERATURE: 98.6 F | OXYGEN SATURATION: 97 % | DIASTOLIC BLOOD PRESSURE: 84 MMHG | RESPIRATION RATE: 16 BRPM | HEART RATE: 84 BPM

## 2021-03-29 DIAGNOSIS — R10.33 PERIUMBILICAL ABDOMINAL PAIN: Primary | ICD-10-CM

## 2021-03-29 DIAGNOSIS — M54.9 BACK PAIN: ICD-10-CM

## 2021-03-29 LAB
ALBUMIN SERPL BCP-MCNC: 4 G/DL (ref 3.5–5)
ALP SERPL-CCNC: 88 U/L (ref 46–116)
ALT SERPL W P-5'-P-CCNC: 32 U/L (ref 12–78)
ANION GAP SERPL CALCULATED.3IONS-SCNC: 10 MMOL/L (ref 4–13)
AST SERPL W P-5'-P-CCNC: 11 U/L (ref 5–45)
BASOPHILS # BLD AUTO: 0.03 THOUSANDS/ΜL (ref 0–0.1)
BASOPHILS NFR BLD AUTO: 0 % (ref 0–1)
BILIRUB SERPL-MCNC: 0.25 MG/DL (ref 0.2–1)
BILIRUB UR QL STRIP: NEGATIVE
BUN SERPL-MCNC: 14 MG/DL (ref 5–25)
CALCIUM SERPL-MCNC: 9.2 MG/DL (ref 8.3–10.1)
CHLORIDE SERPL-SCNC: 103 MMOL/L (ref 100–108)
CLARITY UR: NORMAL
CO2 SERPL-SCNC: 26 MMOL/L (ref 21–32)
COLOR UR: YELLOW
CREAT SERPL-MCNC: 0.76 MG/DL (ref 0.6–1.3)
EOSINOPHIL # BLD AUTO: 0.08 THOUSAND/ΜL (ref 0–0.61)
EOSINOPHIL NFR BLD AUTO: 1 % (ref 0–6)
ERYTHROCYTE [DISTWIDTH] IN BLOOD BY AUTOMATED COUNT: 12 % (ref 11.6–15.1)
EXT PREG TEST URINE: NEGATIVE
EXT. CONTROL ED NAV: NORMAL
GFR SERPL CREATININE-BSD FRML MDRD: 106 ML/MIN/1.73SQ M
GLUCOSE SERPL-MCNC: 98 MG/DL (ref 65–140)
GLUCOSE UR STRIP-MCNC: NEGATIVE MG/DL
HCT VFR BLD AUTO: 40.9 % (ref 34.8–46.1)
HGB BLD-MCNC: 13.4 G/DL (ref 11.5–15.4)
HGB UR QL STRIP.AUTO: NEGATIVE
IMM GRANULOCYTES # BLD AUTO: 0.04 THOUSAND/UL (ref 0–0.2)
IMM GRANULOCYTES NFR BLD AUTO: 0 % (ref 0–2)
KETONES UR STRIP-MCNC: NEGATIVE MG/DL
LEUKOCYTE ESTERASE UR QL STRIP: NEGATIVE
LIPASE SERPL-CCNC: 62 U/L (ref 73–393)
LYMPHOCYTES # BLD AUTO: 2.66 THOUSANDS/ΜL (ref 0.6–4.47)
LYMPHOCYTES NFR BLD AUTO: 22 % (ref 14–44)
MCH RBC QN AUTO: 30.5 PG (ref 26.8–34.3)
MCHC RBC AUTO-ENTMCNC: 32.8 G/DL (ref 31.4–37.4)
MCV RBC AUTO: 93 FL (ref 82–98)
MONOCYTES # BLD AUTO: 0.63 THOUSAND/ΜL (ref 0.17–1.22)
MONOCYTES NFR BLD AUTO: 5 % (ref 4–12)
NEUTROPHILS # BLD AUTO: 8.6 THOUSANDS/ΜL (ref 1.85–7.62)
NEUTS SEG NFR BLD AUTO: 72 % (ref 43–75)
NITRITE UR QL STRIP: NEGATIVE
NRBC BLD AUTO-RTO: 0 /100 WBCS
PH UR STRIP.AUTO: 7 [PH]
PLATELET # BLD AUTO: 352 THOUSANDS/UL (ref 149–390)
PMV BLD AUTO: 9.5 FL (ref 8.9–12.7)
POTASSIUM SERPL-SCNC: 3.9 MMOL/L (ref 3.5–5.3)
PROT SERPL-MCNC: 8.2 G/DL (ref 6.4–8.2)
PROT UR STRIP-MCNC: NEGATIVE MG/DL
RBC # BLD AUTO: 4.4 MILLION/UL (ref 3.81–5.12)
SODIUM SERPL-SCNC: 139 MMOL/L (ref 136–145)
SP GR UR STRIP.AUTO: 1.02 (ref 1–1.03)
UROBILINOGEN UR QL STRIP.AUTO: 0.2 E.U./DL
WBC # BLD AUTO: 12.04 THOUSAND/UL (ref 4.31–10.16)

## 2021-03-29 PROCEDURE — 83690 ASSAY OF LIPASE: CPT | Performed by: EMERGENCY MEDICINE

## 2021-03-29 PROCEDURE — NC001 PR NO CHARGE: Performed by: EMERGENCY MEDICINE

## 2021-03-29 PROCEDURE — 81025 URINE PREGNANCY TEST: CPT | Performed by: EMERGENCY MEDICINE

## 2021-03-29 PROCEDURE — 80053 COMPREHEN METABOLIC PANEL: CPT | Performed by: EMERGENCY MEDICINE

## 2021-03-29 PROCEDURE — 36415 COLL VENOUS BLD VENIPUNCTURE: CPT | Performed by: EMERGENCY MEDICINE

## 2021-03-29 PROCEDURE — 99284 EMERGENCY DEPT VISIT MOD MDM: CPT

## 2021-03-29 PROCEDURE — 81003 URINALYSIS AUTO W/O SCOPE: CPT | Performed by: EMERGENCY MEDICINE

## 2021-03-29 PROCEDURE — 99282 EMERGENCY DEPT VISIT SF MDM: CPT | Performed by: EMERGENCY MEDICINE

## 2021-03-29 PROCEDURE — 76700 US EXAM ABDOM COMPLETE: CPT

## 2021-03-29 PROCEDURE — 85025 COMPLETE CBC W/AUTO DIFF WBC: CPT | Performed by: EMERGENCY MEDICINE

## 2021-03-29 NOTE — Clinical Note
Janette Walters was seen and treated in our emergency department on 3/29/2021  Diagnosis:     Christina Pedraza  may return to work on return date  She may return on this date: 03/31/2021         If you have any questions or concerns, please don't hesitate to call        Mike Villegas MD    ______________________________           _______________          _______________  Hospital Representative                              Date                                Time

## 2021-03-30 NOTE — ED PROVIDER NOTES
History  Chief Complaint   Patient presents with    Abdominal Pain     Pt reports b/l lower abd pain radiating to lower back since this afternoon  States pain is more achey and uncomfortable  Denies any difficulty +nausea      Patient is a 36year old woman with PMHx  Of psoriasis, arthralgias presenting for periumbilical abdominal pain radiating to the lower back  Pain started at around 1:30pm to 2pm 30 minutes after she ate chicken and fries  Pain was initially 10/10 achy associated with nausea  Pain started right above the umbilicus and immediately traveled around to the back  Pain is continuous and was especially uncomfortable with walking and sitting  She has tried tums for the pain and did not alleviate her pain  She does take naproxen daily 220mg (1-2 times a day) for arthralgias  She last took naproxen this morning  Pain is now reduced to 4/10 without any interventions  Patient had  Cholecystectomy several years previous  Prior to Admission Medications   Prescriptions Last Dose Informant Patient Reported?  Taking?   acetaminophen (TYLENOL) 500 mg tablet Past Month at Unknown time Self Yes Yes   Sig: Take 500 mg by mouth every 6 (six) hours as needed for mild pain   ibuprofen (MOTRIN) 200 mg tablet Past Month at Unknown time Self Yes Yes   Sig: Take by mouth every 6 (six) hours as needed for mild pain   levonorgestrel (MIRENA, 52 MG,) 20 MCG/24HR IUD 3/29/2021 at Unknown time  Yes Yes   Sig: by Intrauterine route   triamcinolone (KENALOG) 0 1 % ointment Past Month at Unknown time  No Yes   Sig: Apply topically 2 (two) times a day      Facility-Administered Medications: None       Past Medical History:   Diagnosis Date    Psoriasis        Past Surgical History:   Procedure Laterality Date    CHOLECYSTECTOMY      GALLBLADDER SURGERY  2015    WISDOM TOOTH EXTRACTION         Family History   Problem Relation Age of Onset    Hypertension Mother    [facility] Arthritis Mother     Asthma Mother     Thyroid disease unspecified Mother     No Known Problems Sister     No Known Problems Brother     No Known Problems Daughter     Diabetes Maternal Grandmother     Thyroid disease unspecified Maternal Grandmother     Other Maternal Grandfather         heart problems    No Known Problems Sister     No Known Problems Brother     Asthma Family      I have reviewed and agree with the history as documented      E-Cigarette/Vaping     E-Cigarette/Vaping Substances     Social History     Tobacco Use    Smoking status: Former Smoker    Smokeless tobacco: Never Used   Substance Use Topics    Alcohol use: Yes     Comment: Socially    Drug use: No        Review of Systems    Physical Exam  ED Triage Vitals [03/29/21 2006]   Temperature Pulse Respirations Blood Pressure SpO2   98 6 °F (37 °C) 84 17 146/78 98 %      Temp Source Heart Rate Source Patient Position - Orthostatic VS BP Location FiO2 (%)   Oral Monitor Sitting Left arm --      Pain Score       4             Orthostatic Vital Signs  Vitals:    03/29/21 2006 03/29/21 2227   BP: 146/78 125/84   Pulse: 84 84   Patient Position - Orthostatic VS: Sitting Sitting       Physical Exam    ED Medications  Medications - No data to display    Diagnostic Studies  Results Reviewed     Procedure Component Value Units Date/Time    Comprehensive metabolic panel [[id number]] Collected: 03/29/21 2123    Lab Status: Final result Specimen: Blood from Arm, Right Updated: 03/29/21 2150     Sodium 139 mmol/L      Potassium 3 9 mmol/L      Chloride 103 mmol/L      CO2 26 mmol/L      ANION GAP 10 mmol/L      BUN 14 mg/dL      Creatinine 0 76 mg/dL      Glucose 98 mg/dL      Calcium 9 2 mg/dL      AST 11 U/L      ALT 32 U/L      Alkaline Phosphatase 88 U/L      Total Protein 8 2 g/dL      Albumin 4 0 g/dL      Total Bilirubin 0 25 mg/dL      eGFR 106 ml/min/1 73sq m     Narrative:      [facility] guidelines for Chronic Kidney Disease (CKD):     Stage 1 with normal or high GFR (GFR > 90 mL/min/1 73 square meters)    Stage 2 Mild CKD (GFR = 60-89 mL/min/1 73 square meters)    Stage 3A Moderate CKD (GFR = 45-59 mL/min/1 73 square meters)    Stage 3B Moderate CKD (GFR = 30-44 mL/min/1 73 square meters)    Stage 4 Severe CKD (GFR = 15-29 mL/min/1 73 square meters)    Stage 5 End Stage CKD (GFR <15 mL/min/1 73 square meters)  Note: GFR calculation is accurate only with a steady state creatinine    Lipase [[id number]]  (Abnormal) Collected: 03/29/21 2123    Lab Status: Final result Specimen: Blood from Arm, Right Updated: 03/29/21 2150     Lipase 62 u/L     CBC and differential [[id number]]  (Abnormal) Collected: 03/29/21 2123    Lab Status: Final result Specimen: Blood from Arm, Right Updated: 03/29/21 2132     WBC 12 04 Thousand/uL      RBC 4 40 Million/uL      Hemoglobin 13 4 g/dL      Hematocrit 40 9 %      MCV 93 fL      MCH 30 5 pg      MCHC 32 8 g/dL      RDW 12 0 %      MPV 9 5 fL      Platelets 767 Thousands/uL      nRBC 0 /100 WBCs      Neutrophils Relative 72 %      Immat GRANS % 0 %      Lymphocytes Relative 22 %      Monocytes Relative 5 %      Eosinophils Relative 1 %      Basophils Relative 0 %      Neutrophils Absolute 8 60 Thousands/µL      Immature Grans Absolute 0 04 Thousand/uL      Lymphocytes Absolute 2 66 Thousands/µL      Monocytes Absolute 0 63 Thousand/µL      Eosinophils Absolute 0 08 Thousand/µL      Basophils Absolute 0 03 Thousands/µL     UA w Reflex to Microscopic w Reflex to Culture [[id number]] Collected: 03/29/21 2028    Lab Status: Final result Specimen: Urine, Clean Catch Updated: 03/29/21 2035     Color, UA Yellow     Clarity, UA Cloudy     Specific [place], UA 1 020     pH, UA 7 0     Leukocytes, UA Negative     Nitrite, UA Negative     Protein, UA Negative mg/dl      Glucose, UA Negative mg/dl      Ketones, UA Negative mg/dl      Urobilinogen, UA 0 2 E U /dl      Bilirubin, UA Negative     Blood, UA Negative    POCT pregnancy, urine [[id number]] (Normal) Resulted: 03/29/21 2033    Lab Status: Final result Updated: 03/29/21 2033     EXT PREG TEST UR (Ref: Negative) negative     Control valid                 US abdomen complete   Final Result by Liana Mcgee MD (03/29 2302)      No acute sonographic findings  Workstation performed: [id number]               Procedures  Procedures      ED Course                             SBIRT 22yo+      Most Recent Value   SBIRT (23 yo +)   In order to provide better care to our patients, we are screening all of our patients for alcohol and drug use  Would it be okay to ask you these screening questions? Yes Filed at: 03/29/2021 2008   Initial Alcohol Screen: US AUDIT-C    1  How often do you have a drink containing alcohol? 1 Filed at: 03/29/2021 2008   2  How many drinks containing alcohol do you have on a typical day you are drinking? 1 Filed at: 03/29/2021 2008   3a  Male UNDER 65: How often do you have five or more drinks on one occasion? 0 Filed at: 03/29/2021 2008   3b  FEMALE Any Age, or MALE 65+: How often do you have 4 or more drinks on one occassion? 0 Filed at: 03/29/2021 2008   Audit-C Score  2 Filed at: 03/29/2021 2008   STONEY: How many times in the past year have you    Used an illegal drug or used a prescription medication for non-medical reasons? Never Filed at: 03/29/2021 2008                MDM  Number of Diagnoses or Management Options  Back pain: established and improving  Periumbilical abdominal pain: established and improving  Diagnosis management comments: Patient is a 36year old woman presenting for abdominal pain radiating to the back  Differential for abdominal pain are GERD, Peptic Ulcer, Pancreatitis, Appendicitis  Will need to rule out pancreatitis  Ordered CBC, CMP, Lipase for consideration of pancreatitis  Ordered US for possible gallbladder etiologies  CBC, CMP not concerning for electrolyte disturbances that may cause abdominal tenderness   Leukocytosis noted but no other abnormalities noted on CBC  Ultrasound did not find any acute sonographic abnormalities  Patient is safe and hemodynamically stable for discharge  Patient is to followup with PCP as soon as possible  Recommend Tylenol for pain  Amount and/or Complexity of Data Reviewed  Clinical lab tests: ordered and reviewed  Tests in the radiology section of CPT®: ordered and reviewed    Risk of Complications, Morbidity, and/or Mortality  Presenting problems: low  Diagnostic procedures: low  Management options: low        Disposition  Final diagnoses:   Periumbilical abdominal pain   Back pain     Time reflects when diagnosis was documented in both MDM as applicable and the Disposition within this note     Time User Action Codes Description Comment    3/29/2021 11:11 PM Chuyita Ward [D31 33] Periumbilical abdominal pain     3/29/2021 11:11 PM Chuyita Ward [M54 9] Back pain       ED Disposition     ED Disposition Condition Date/Time Comment    Discharge Stable Mon Mar 29, 2021 11:11 PM Luda Vanessa discharge to home/self care  Follow-up Information     Follow up With Specialties Details Why Contact Info Additional Information    [facility] Call in 2 days  [address] [place] [address] Emergency Department Emergency Medicine Go to  If symptoms worsen [address] Emergency Department, [address], [place], [place], [address]          Patient's Medications   Discharge Prescriptions    No medications on file     No discharge procedures on file  PDMP Review     None           ED Provider  Attending physically available and evaluated Luda Vanessa  I managed the patient along with the ED Attending      Electronically Signed by         Lesly Roblero MD  03/29/21 0597

## 2021-03-30 NOTE — DISCHARGE INSTRUCTIONS
Diagnosis; mid abdominal pain     - activity/diet as tolerated     - for any pain- over the counter tylenol 500 mg every 4 hrs     - please return to  the er for any  fevers- temp > 100 4/ any worsening/ intractable abdominal pain / any abdominal pain localizing to your right lower abdomen- appendicitis - any bloody /coffee ground vomitus- any bloody/black tarry stools

## 2021-04-01 NOTE — ED ATTENDING ATTESTATION
3/29/2021  I, Sue Nickerson MD, saw and evaluated the patient  I have discussed the patient with the resident/non-physician practitioner and agree with the resident's/non-physician practitioner's findings, Plan of Care, and MDM as documented in the resident's/non-physician practitioner's note, except where noted  All available labs and Radiology studies were reviewed  I was present for key portions of any procedure(s) performed by the resident/non-physician practitioner and I was immediately available to provide assistance  At this point I agree with the current assessment done in the Emergency Department    I have conducted an independent evaluation of this patient a history and physical is as follows:    ED Course  ED Course as of Apr 01 1251   Mon Mar 29, 2021   2111 Er md note- pt offered pain medication- refuses            Critical Care Time  Procedures

## 2021-04-03 NOTE — QUICK NOTE
ROS and PE addition to ED Provider Note  Please Refer to ED Provider Note and ED Attending Attestation for more information about visit  Review of Systems   Constitutional: Negative for fever  HENT: Negative for voice change  Eyes: Negative for visual disturbance  Respiratory: Negative for chest tightness  Cardiovascular: Negative for chest pain  Gastrointestinal: Positive for abdominal pain and nausea  Negative for constipation and diarrhea  Endocrine: Negative for polyuria  Genitourinary: Negative for dysuria  Musculoskeletal: Positive for back pain  Skin: Negative for rash  Allergic/Immunologic: Negative for environmental allergies and food allergies  Neurological: Negative for weakness and numbness  Psychiatric/Behavioral: Negative for agitation  Physical Exam  Vitals signs and nursing note reviewed  Constitutional:       General: She is not in acute distress  Appearance: She is well-developed  HENT:      Head: Normocephalic and atraumatic  Eyes:      Conjunctiva/sclera: Conjunctivae normal    Neck:      Musculoskeletal: Neck supple  Cardiovascular:      Rate and Rhythm: Normal rate and regular rhythm  Heart sounds: No murmur  Pulmonary:      Effort: Pulmonary effort is normal  No respiratory distress  Breath sounds: Normal breath sounds  Abdominal:      Palpations: Abdomen is soft  Tenderness: There is abdominal tenderness in the periumbilical area  Skin:     General: Skin is warm and dry  Neurological:      Mental Status: She is alert

## 2021-04-07 NOTE — ED PROVIDER NOTES
History  Chief Complaint   Patient presents with    Abdominal Pain     Pt reports b/l lower abd pain radiating to lower back since this afternoon  States pain is more achey and uncomfortable  Denies any difficulty +nausea      36 yr female c/o gradual onset of bilateral lower abd pain since thsi afternoon radiation to back with nausea=--  Constant in nature- no fevers- no v/d- no gu/gyn comps- no injury       History provided by:  Patient   used: No    Abdominal Pain  Pain quality: aching    Associated symptoms: no constipation, no diarrhea, no nausea and no vomiting        Prior to Admission Medications   Prescriptions Last Dose Informant Patient Reported?  Taking?   acetaminophen (TYLENOL) 500 mg tablet Past Month at Unknown time Self Yes Yes   Sig: Take 500 mg by mouth every 6 (six) hours as needed for mild pain   ibuprofen (MOTRIN) 200 mg tablet Past Month at Unknown time Self Yes Yes   Sig: Take by mouth every 6 (six) hours as needed for mild pain   levonorgestrel (MIRENA, 52 MG,) 20 MCG/24HR IUD 3/29/2021 at Unknown time  Yes Yes   Sig: by Intrauterine route   triamcinolone (KENALOG) 0 1 % ointment Past Month at Unknown time  No Yes   Sig: Apply topically 2 (two) times a day      Facility-Administered Medications: None       Past Medical History:   Diagnosis Date    Psoriasis        Past Surgical History:   Procedure Laterality Date    CHOLECYSTECTOMY      GALLBLADDER SURGERY  2015    WISDOM TOOTH EXTRACTION         Family History   Problem Relation Age of Onset    Hypertension Mother    [facility] Arthritis Mother     Asthma Mother     Thyroid disease unspecified Mother     No Known Problems Sister     No Known Problems Brother     No Known Problems Daughter     Diabetes Maternal Grandmother     Thyroid disease unspecified Maternal Grandmother     Other Maternal Grandfather         heart problems    No Known Problems Sister     No Known Problems Brother     Asthma Family      I have reviewed and agree with the history as documented  E-Cigarette/Vaping     E-Cigarette/Vaping Substances     Social History     Tobacco Use    Smoking status: Former Smoker    Smokeless tobacco: Never Used   Substance Use Topics    Alcohol use: Yes     Comment: Socially    Drug use: No       Review of Systems   Constitutional: Negative  HENT: Negative  Eyes: Negative  Respiratory: Negative  Cardiovascular: Negative  Gastrointestinal: Positive for abdominal pain  Negative for abdominal distention, anal bleeding, blood in stool, constipation, diarrhea, nausea, rectal pain and vomiting  Endocrine: Negative  Genitourinary: Negative  Musculoskeletal: Negative  Skin: Negative  Allergic/Immunologic: Negative  Neurological: Negative  Hematological: Negative  Psychiatric/Behavioral: Negative  Physical Exam  Physical Exam  Vitals signs and nursing note reviewed  Constitutional:       General: She is not in acute distress  Appearance: She is well-developed  She is not ill-appearing, toxic-appearing or diaphoretic  Comments: AVSS-  PUL;SE OX 98 % ON RA- INTERPRETATION IS NORMAL- NO INTERVENTION- WELL APPEARING- IN NAD    HENT:      Head: Normocephalic and atraumatic  Eyes:      General: No scleral icterus  Extraocular Movements: Extraocular movements intact  Pupils: Pupils are equal, round, and reactive to light  Comments: MM PINK   Cardiovascular:      Rate and Rhythm: Normal rate and regular rhythm  Heart sounds: Normal heart sounds  No murmur  No friction rub  No gallop  Pulmonary:      Effort: Pulmonary effort is normal  No respiratory distress  Breath sounds: Normal breath sounds  No stridor  No wheezing, rhonchi or rales  Chest:      Chest wall: No tenderness  Abdominal:      General: Bowel sounds are normal  There is no distension or abdominal bruit  There are no signs of injury  Palpations: Abdomen is soft   There is no shifting dullness, fluid wave, hepatomegaly, splenomegaly, mass or pulsatile mass  Tenderness: There is abdominal tenderness in the periumbilical area  There is no right CVA tenderness, left CVA tenderness, guarding or rebound  Negative signs include Haskins's sign, Rovsing's sign, McBurney's sign, psoas sign and obturator sign  Hernia: No hernia is present  There is no hernia in the umbilical area, ventral area, left inguinal area, right femoral area, left femoral area or right inguinal area  Comments: SOFT - MILD UMBILICAL AREA TENDERNESS- NO HSM- CVA TENDERNESS- NO PERITONEAL SIGNS   Skin:     General: Skin is warm and dry  Capillary Refill: Capillary refill takes less than 2 seconds  Coloration: Skin is not cyanotic, jaundiced, mottled or pale  Findings: No erythema or rash  Neurological:      General: No focal deficit present  Mental Status: She is alert and oriented to person, place, and time  Cranial Nerves: No cranial nerve deficit  Motor: No weakness  Psychiatric:         Mood and Affect: Mood normal  Mood is not anxious or depressed           Behavior: Behavior normal          Vital Signs  ED Triage Vitals [03/29/21 2006]   Temperature Pulse Respirations Blood Pressure SpO2   98 6 °F (37 °C) 84 17 146/78 98 %      Temp Source Heart Rate Source Patient Position - Orthostatic VS BP Location FiO2 (%)   Oral Monitor Sitting Left arm --      Pain Score       4           Vitals:    03/29/21 2006 03/29/21 2227   BP: 146/78 125/84   Pulse: 84 84   Patient Position - Orthostatic VS: Sitting Sitting         Visual Acuity      ED Medications  Medications - No data to display    Diagnostic Studies  Results Reviewed     Procedure Component Value Units Date/Time    Comprehensive metabolic panel [[id number]] Collected: 03/29/21 2123    Lab Status: Final result Specimen: Blood from Arm, Right Updated: 03/29/21 2150     Sodium 139 mmol/L      Potassium 3 9 mmol/L      Chloride 103 mmol/L      CO2 26 mmol/L      ANION GAP 10 mmol/L      BUN 14 mg/dL      Creatinine 0 76 mg/dL      Glucose 98 mg/dL      Calcium 9 2 mg/dL      AST 11 U/L      ALT 32 U/L      Alkaline Phosphatase 88 U/L      Total Protein 8 2 g/dL      Albumin 4 0 g/dL      Total Bilirubin 0 25 mg/dL      eGFR 106 ml/min/1 73sq m     Narrative:      [employer] guidelines for Chronic Kidney Disease (CKD):     Stage 1 with normal or high GFR (GFR > 90 mL/min/1 73 square meters)    Stage 2 Mild CKD (GFR = 60-89 mL/min/1 73 square meters)    Stage 3A Moderate CKD (GFR = 45-59 mL/min/1 73 square meters)    Stage 3B Moderate CKD (GFR = 30-44 mL/min/1 73 square meters)    Stage 4 Severe CKD (GFR = 15-29 mL/min/1 73 square meters)    Stage 5 End Stage CKD (GFR <15 mL/min/1 73 square meters)  Note: GFR calculation is accurate only with a steady state creatinine    Lipase [[id number]]  (Abnormal) Collected: 03/29/21 2123    Lab Status: Final result Specimen: Blood from Arm, Right Updated: 03/29/21 2150     Lipase 62 u/L     CBC and differential [[id number]]  (Abnormal) Collected: 03/29/21 2123    Lab Status: Final result Specimen: Blood from Arm, Right Updated: 03/29/21 2132     WBC 12 04 Thousand/uL      RBC 4 40 Million/uL      Hemoglobin 13 4 g/dL      Hematocrit 40 9 %      MCV 93 fL      MCH 30 5 pg      MCHC 32 8 g/dL      RDW 12 0 %      MPV 9 5 fL      Platelets 831 Thousands/uL      nRBC 0 /100 WBCs      Neutrophils Relative 72 %      Immat GRANS % 0 %      Lymphocytes Relative 22 %      Monocytes Relative 5 %      Eosinophils Relative 1 %      Basophils Relative 0 %      Neutrophils Absolute 8 60 Thousands/µL      Immature Grans Absolute 0 04 Thousand/uL      Lymphocytes Absolute 2 66 Thousands/µL      Monocytes Absolute 0 63 Thousand/µL      Eosinophils Absolute 0 08 Thousand/µL      Basophils Absolute 0 03 Thousands/µL     UA w Reflex to Microscopic w Reflex to Culture [[id number]] Collected: 03/29/21 2028    Lab Status: Final result Specimen: Urine, Clean Catch Updated: 03/29/21 2035     Color, UA Yellow     Clarity, UA Cloudy     Specific [place], UA 1 020     pH, UA 7 0     Leukocytes, UA Negative     Nitrite, UA Negative     Protein, UA Negative mg/dl      Glucose, UA Negative mg/dl      Ketones, UA Negative mg/dl      Urobilinogen, UA 0 2 E U /dl      Bilirubin, UA Negative     Blood, UA Negative    POCT pregnancy, urine [[id number]]  (Normal) Resulted: 03/29/21 2033    Lab Status: Final result Updated: 03/29/21 2033     EXT PREG TEST UR (Ref: Negative) negative     Control valid                 US abdomen complete   Final Result by Ange Chowdary MD (03/29 2302)      No acute sonographic findings  Workstation performed: [id number]                    Procedures  Procedures         ED Course  ED Course as of Apr 07 0327   Garrison Ramachandrang Mar 29, 2021   2111 Er md note- pt offered pain medication- refuses                                SBIRT 20yo+      Most Recent Value   SBIRT (23 yo +)   In order to provide better care to our patients, we are screening all of our patients for alcohol and drug use  Would it be okay to ask you these screening questions? Yes Filed at: 03/29/2021 2008   Initial Alcohol Screen: US AUDIT-C    1  How often do you have a drink containing alcohol? 1 Filed at: 03/29/2021 2008   2  How many drinks containing alcohol do you have on a typical day you are drinking? 1 Filed at: 03/29/2021 2008   3a  Male UNDER 65: How often do you have five or more drinks on one occasion? 0 Filed at: 03/29/2021 2008   3b  FEMALE Any Age, or MALE 65+: How often do you have 4 or more drinks on one occassion? 0 Filed at: 03/29/2021 2008   Audit-C Score  2 Filed at: 03/29/2021 2008   STONEY: How many times in the past year have you    Used an illegal drug or used a prescription medication for non-medical reasons?   Never Filed at: 03/29/2021 2008                    MDM      Disposition  Final diagnoses:   Periumbilical abdominal pain   Back pain     Time reflects when diagnosis was documented in both MDM as applicable and the Disposition within this note     Time User Action Codes Description Comment    3/29/2021 11:11 PM Rashad Devlin Add [X04 59] Periumbilical abdominal pain     3/29/2021 11:11 PM Rashad Kiaas Add [M54 9] Back pain       ED Disposition     ED Disposition Condition Date/Time Comment    Discharge Stable Mon Mar 29, 2021 11:11 PM [place] discharge to home/self care  Follow-up Information     Follow up With Specialties Details Why Contact Info Additional Information    [facility] Call in 2 days  [address] Emergency Department Emergency Medicine Go to  If symptoms worsen [address] [facility] [id number] [place] [address] Emergency Department, [address], [place], [place], [address]          Discharge Medication List as of 3/29/2021 11:19 PM      CONTINUE these medications which have NOT CHANGED    Details   acetaminophen (TYLENOL) 500 mg tablet Take 500 mg by mouth every 6 (six) hours as needed for mild pain, Historical Med      ibuprofen (MOTRIN) 200 mg tablet Take by mouth every 6 (six) hours as needed for mild pain, Historical Med      levonorgestrel (MIRENA, 52 MG,) 20 MCG/24HR IUD by Intrauterine route, Historical Med      triamcinolone (KENALOG) 0 1 % ointment Apply topically 2 (two) times a day, Starting Fri 2/26/2021, Normal           No discharge procedures on file      PDMP Review     None          ED Provider  Electronically Signed by           Keturah Landin MD  04/07/21 0222       Keturah Landin MD  04/07/21 1272

## 2021-04-07 NOTE — ED PROVIDER NOTES
History  Chief Complaint   Patient presents with    Abdominal Pain     Pt reports b/l lower abd pain radiating to lower back since this afternoon  States pain is more achey and uncomfortable  Denies any difficulty +nausea      HPI    Prior to Admission Medications   Prescriptions Last Dose Informant Patient Reported? Taking?   acetaminophen (TYLENOL) 500 mg tablet Past Month at Unknown time Self Yes Yes   Sig: Take 500 mg by mouth every 6 (six) hours as needed for mild pain   ibuprofen (MOTRIN) 200 mg tablet Past Month at Unknown time Self Yes Yes   Sig: Take by mouth every 6 (six) hours as needed for mild pain   levonorgestrel (MIRENA, 52 MG,) 20 MCG/24HR IUD 3/29/2021 at Unknown time  Yes Yes   Sig: by Intrauterine route   triamcinolone (KENALOG) 0 1 % ointment Past Month at Unknown time  No Yes   Sig: Apply topically 2 (two) times a day      Facility-Administered Medications: None       Past Medical History:   Diagnosis Date    Psoriasis        Past Surgical History:   Procedure Laterality Date    CHOLECYSTECTOMY      GALLBLADDER SURGERY  2015    WISDOM TOOTH EXTRACTION         Family History   Problem Relation Age of Onset    Hypertension Mother    Magnus Abt Arthritis Mother     Asthma Mother     Thyroid disease unspecified Mother     No Known Problems Sister     No Known Problems Brother     No Known Problems Daughter     Diabetes Maternal Grandmother     Thyroid disease unspecified Maternal Grandmother     Other Maternal Grandfather         heart problems    No Known Problems Sister     No Known Problems Brother     Asthma Family      I have reviewed and agree with the history as documented  E-Cigarette/Vaping     E-Cigarette/Vaping Substances     Social History     Tobacco Use    Smoking status: Former Smoker    Smokeless tobacco: Never Used   Substance Use Topics    Alcohol use: Yes     Comment: Socially    Drug use: No       Review of Systems   Constitutional: Negative  HENT: Negative  Eyes: Negative  Respiratory: Negative  Cardiovascular: Negative  Gastrointestinal: Positive for abdominal pain  Negative for abdominal distention, anal bleeding, blood in stool, constipation, diarrhea, nausea, rectal pain and vomiting  Endocrine: Negative  Genitourinary: Negative  Musculoskeletal: Negative  Skin: Negative  Allergic/Immunologic: Negative  Neurological: Negative  Hematological: Negative  Psychiatric/Behavioral: Negative  Physical Exam  Physical Exam  Vitals signs and nursing note reviewed  Constitutional:       General: She is not in acute distress  Appearance: She is well-developed  She is not ill-appearing, toxic-appearing or diaphoretic  Comments: AVSS-  PUL;SE OX 98 % ON RA- INTERPRETATION IS NORMAL- NO INTERVENTION- WELL APPEARING- IN NAD    HENT:      Head: Normocephalic and atraumatic  Eyes:      General: No scleral icterus  Extraocular Movements: Extraocular movements intact  Pupils: Pupils are equal, round, and reactive to light  Comments: MM PINK   Cardiovascular:      Rate and Rhythm: Normal rate and regular rhythm  Heart sounds: Normal heart sounds  No murmur  No friction rub  No gallop  Pulmonary:      Effort: Pulmonary effort is normal  No respiratory distress  Breath sounds: Normal breath sounds  No stridor  No wheezing, rhonchi or rales  Chest:      Chest wall: No tenderness  Abdominal:      General: Bowel sounds are normal  There is no distension or abdominal bruit  There are no signs of injury  Palpations: Abdomen is soft  There is no shifting dullness, fluid wave, hepatomegaly, splenomegaly, mass or pulsatile mass  Tenderness: There is abdominal tenderness in the periumbilical area  There is no right CVA tenderness, left CVA tenderness, guarding or rebound  Negative signs include Haskins's sign, Rovsing's sign, McBurney's sign, psoas sign and obturator sign        Hernia: No hernia is present  There is no hernia in the umbilical area, ventral area, left inguinal area, right femoral area, left femoral area or right inguinal area  Comments: SOFT - MILD UMBILICAL AREA TENDERNESS- NO HSM- CVA TENDERNESS- NO PERITONEAL SIGNS   Skin:     General: Skin is warm and dry  Capillary Refill: Capillary refill takes less than 2 seconds  Coloration: Skin is not cyanotic, jaundiced, mottled or pale  Findings: No erythema or rash  Neurological:      General: No focal deficit present  Mental Status: She is alert and oriented to person, place, and time  Cranial Nerves: No cranial nerve deficit  Motor: No weakness  Psychiatric:         Mood and Affect: Mood normal  Mood is not anxious or depressed           Behavior: Behavior normal          Vital Signs  ED Triage Vitals [03/29/21 2006]   Temperature Pulse Respirations Blood Pressure SpO2   98 6 °F (37 °C) 84 17 146/78 98 %      Temp Source Heart Rate Source Patient Position - Orthostatic VS BP Location FiO2 (%)   Oral Monitor Sitting Left arm --      Pain Score       4           Vitals:    03/29/21 2006 03/29/21 2227   BP: 146/78 125/84   Pulse: 84 84   Patient Position - Orthostatic VS: Sitting Sitting         Visual Acuity      ED Medications  Medications - No data to display    Diagnostic Studies  Results Reviewed     Procedure Component Value Units Date/Time    Comprehensive metabolic panel [[id number]] Collected: 03/29/21 2123    Lab Status: Final result Specimen: Blood from Arm, Right Updated: 03/29/21 2150     Sodium 139 mmol/L      Potassium 3 9 mmol/L      Chloride 103 mmol/L      CO2 26 mmol/L      ANION GAP 10 mmol/L      BUN 14 mg/dL      Creatinine 0 76 mg/dL      Glucose 98 mg/dL      Calcium 9 2 mg/dL      AST 11 U/L      ALT 32 U/L      Alkaline Phosphatase 88 U/L      Total Protein 8 2 g/dL      Albumin 4 0 g/dL      Total Bilirubin 0 25 mg/dL      eGFR 106 ml/min/1 73sq m     Narrative:      National Kidney Disease Foundation guidelines for Chronic Kidney Disease (CKD):     Stage 1 with normal or high GFR (GFR > 90 mL/min/1 73 square meters)    Stage 2 Mild CKD (GFR = 60-89 mL/min/1 73 square meters)    Stage 3A Moderate CKD (GFR = 45-59 mL/min/1 73 square meters)    Stage 3B Moderate CKD (GFR = 30-44 mL/min/1 73 square meters)    Stage 4 Severe CKD (GFR = 15-29 mL/min/1 73 square meters)    Stage 5 End Stage CKD (GFR <15 mL/min/1 73 square meters)  Note: GFR calculation is accurate only with a steady state creatinine    Lipase [[id number]]  (Abnormal) Collected: 03/29/21 2123    Lab Status: Final result Specimen: Blood from Arm, Right Updated: 03/29/21 2150     Lipase 62 u/L     CBC and differential [[id number]]  (Abnormal) Collected: 03/29/21 2123    Lab Status: Final result Specimen: Blood from Arm, Right Updated: 03/29/21 2132     WBC 12 04 Thousand/uL      RBC 4 40 Million/uL      Hemoglobin 13 4 g/dL      Hematocrit 40 9 %      MCV 93 fL      MCH 30 5 pg      MCHC 32 8 g/dL      RDW 12 0 %      MPV 9 5 fL      Platelets 253 Thousands/uL      nRBC 0 /100 WBCs      Neutrophils Relative 72 %      Immat GRANS % 0 %      Lymphocytes Relative 22 %      Monocytes Relative 5 %      Eosinophils Relative 1 %      Basophils Relative 0 %      Neutrophils Absolute 8 60 Thousands/µL      Immature Grans Absolute 0 04 Thousand/uL      Lymphocytes Absolute 2 66 Thousands/µL      Monocytes Absolute 0 63 Thousand/µL      Eosinophils Absolute 0 08 Thousand/µL      Basophils Absolute 0 03 Thousands/µL     UA w Reflex to Microscopic w Reflex to Culture [[id number]] Collected: 03/29/21 2028    Lab Status: Final result Specimen: Urine, Clean Catch Updated: 03/29/21 2035     Color, UA Yellow     Clarity, UA Cloudy     Specific [place], UA 1 020     pH, UA 7 0     Leukocytes, UA Negative     Nitrite, UA Negative     Protein, UA Negative mg/dl      Glucose, UA Negative mg/dl      Ketones, UA Negative mg/dl      Urobilinogen, UA 0 2 E U /dl      Bilirubin, UA Negative     Blood, UA Negative    POCT pregnancy, urine [[id number]]  (Normal) Resulted: 03/29/21 2033    Lab Status: Final result Updated: 03/29/21 2033     EXT PREG TEST UR (Ref: Negative) negative     Control valid                 US abdomen complete   Final Result by Mimi Forte MD (03/29 2302)      No acute sonographic findings  Workstation performed: [id number]                    Procedures  Procedures         ED Course  ED Course as of Apr 07 0219   Mon Mar 29, 2021   2111 Er md note- pt offered pain medication- refuses                                SBIRT 20yo+      Most Recent Value   SBIRT (23 yo +)   In order to provide better care to our patients, we are screening all of our patients for alcohol and drug use  Would it be okay to ask you these screening questions? Yes Filed at: 03/29/2021 2008   Initial Alcohol Screen: US AUDIT-C    1  How often do you have a drink containing alcohol? 1 Filed at: 03/29/2021 2008   2  How many drinks containing alcohol do you have on a typical day you are drinking? 1 Filed at: 03/29/2021 2008   3a  Male UNDER 65: How often do you have five or more drinks on one occasion? 0 Filed at: 03/29/2021 2008   3b  FEMALE Any Age, or MALE 65+: How often do you have 4 or more drinks on one occassion? 0 Filed at: 03/29/2021 2008   Audit-C Score  2 Filed at: 03/29/2021 2008   STONEY: How many times in the past year have you    Used an illegal drug or used a prescription medication for non-medical reasons?   Never Filed at: 03/29/2021 2008                    MDM    Disposition  Final diagnoses:   Periumbilical abdominal pain   Back pain     Time reflects when diagnosis was documented in both MDM as applicable and the Disposition within this note     Time User Action Codes Description Comment    3/29/2021 11:11 PM Joni Banco Add [K33 40] Periumbilical abdominal pain     3/29/2021 11:11 PM Joni Banco Add [M54 9] Back pain       ED Disposition ED Disposition Condition Date/Time Comment    Discharge Stable Mon Mar 29, 2021 [address] discharge to home/self care  Follow-up Information     Follow up With Specialties Details Why Contact Info Additional Information    [facility] Call in 2 days  [address] Emergency Department Emergency Medicine Go to  If symptoms worsen [address] Emergency Department, [address], [place], [place], [address]          Discharge Medication List as of 3/29/2021 11:19 PM      CONTINUE these medications which have NOT CHANGED    Details   acetaminophen (TYLENOL) 500 mg tablet Take 500 mg by mouth every 6 (six) hours as needed for mild pain, Historical Med      ibuprofen (MOTRIN) 200 mg tablet Take by mouth every 6 (six) hours as needed for mild pain, Historical Med      levonorgestrel (MIRENA, 52 MG,) 20 MCG/24HR IUD by Intrauterine route, Historical Med      triamcinolone (KENALOG) 0 1 % ointment Apply topically 2 (two) times a day, Starting Fri 2/26/2021, Normal           No discharge procedures on file      PDMP Review     None          ED Provider  Electronically Signed by           Zuleima Aceves MD  04/07/21 6685

## 2021-05-05 ENCOUNTER — OFFICE VISIT (OUTPATIENT)
Dept: RHEUMATOLOGY | Facility: CLINIC | Age: 31
End: 2021-05-05
Payer: COMMERCIAL

## 2021-05-05 VITALS
BODY MASS INDEX: 33.29 KG/M2 | DIASTOLIC BLOOD PRESSURE: 86 MMHG | HEART RATE: 99 BPM | WEIGHT: 195 LBS | SYSTOLIC BLOOD PRESSURE: 125 MMHG | HEIGHT: 64 IN

## 2021-05-05 DIAGNOSIS — L40.50 PSORIATIC ARTHRITIS (HCC): Primary | ICD-10-CM

## 2021-05-05 PROCEDURE — 3008F BODY MASS INDEX DOCD: CPT | Performed by: INTERNAL MEDICINE

## 2021-05-05 PROCEDURE — 1036F TOBACCO NON-USER: CPT | Performed by: INTERNAL MEDICINE

## 2021-05-05 PROCEDURE — 99243 OFF/OP CNSLTJ NEW/EST LOW 30: CPT | Performed by: INTERNAL MEDICINE

## 2021-05-05 RX ORDER — HYDROXYCHLOROQUINE SULFATE 200 MG/1
200 TABLET, FILM COATED ORAL 2 TIMES DAILY WITH MEALS
Qty: 60 TABLET | Refills: 5 | Status: SHIPPED | OUTPATIENT
Start: 2021-05-05 | End: 2021-06-04

## 2021-05-05 RX ORDER — NAPROXEN 250 MG/1
250 TABLET ORAL 2 TIMES DAILY WITH MEALS
COMMUNITY

## 2021-05-05 NOTE — PROGRESS NOTES
Rheumatology Consult   Torey Green 36 y o  female 1990    DATE: 5/5/2021    Reason for Consult: joint pain and psoriasis    Assessment and Plan:  Arthritis--Topical NSAIDs  Anti-inflammatory diet  Begin Plaquenil 200mg daily and titrate to 400mg daily  Baseline opthal eval   If symptoms do not improve then she likely will be a good candidate for [facility] for psoriatic arthritis  Repeat labs and f/u in 3 mos  xrays SI joints  History of Present Illness:  Torey Green is a 36 y o  female With psoriasis and worsening joint pain/swelling and stiffness involving her hands/wrists and elbows and feet  She has an elevated esr/crp and +SHANNON  Negative RF/CCP ab  She also has inflammatory spinal pain, dry eye/mouth  No symptoms to suggest SLE despite a +SHANNON  +malaise       Review of Systems  Review of Systems   Constitutional: Positive for fatigue  Negative for chills, fever and unexpected weight change  HENT: Negative for mouth sores and trouble swallowing  Eyes: Negative for pain and visual disturbance  Respiratory: Negative for cough and shortness of breath  Cardiovascular: Negative for chest pain and leg swelling  Gastrointestinal: Negative for abdominal pain, blood in stool, constipation, diarrhea and nausea  Musculoskeletal: Positive for arthralgias and back pain  Negative for joint swelling and myalgias  Skin: Negative for color change and rash  Neurological: Negative for weakness and numbness  Hematological: Negative for adenopathy  Psychiatric/Behavioral: Negative for sleep disturbance  Allergies  No Known Allergies    Current Medications      Past Medical History  Past Medical History:   Diagnosis Date    Psoriasis        Past Surgical History  Past Surgical History:   Procedure Laterality Date    CHOLECYSTECTOMY      GALLBLADDER SURGERY  2015    WISDOM TOOTH EXTRACTION         Family History  No known autoimmune or inflammatory diseases in the family     Family History   Problem Relation Age of Onset    Hypertension Mother    [facility] Polio Arthritis Mother     Asthma Mother     Thyroid disease unspecified Mother     No Known Problems Sister     No Known Problems Brother     No Known Problems Daughter     Diabetes Maternal Grandmother     Thyroid disease unspecified Maternal Grandmother     Other Maternal Grandfather         heart problems    No Known Problems Sister     No Known Problems Brother     Asthma Family        Social History  Occupation: [employer] employee  Social History     Substance and Sexual Activity   Alcohol Use Yes    Comment: Socially     Social History     Substance and Sexual Activity   Drug Use No     Social History     Tobacco Use   Smoking Status Former Smoker   Smokeless Tobacco Never Used        Objective: There were no vitals taken for this visit  Physical Exam  Musculoskeletal:         General: Swelling and tenderness present  Right shoulder: She exhibits decreased range of motion  Left shoulder: She exhibits decreased range of motion  Left knee: She exhibits swelling  Tenderness found  Hands:             Lab Results: I have personally reviewed pertinent reports        CBC:   , Chemistry Profile:       Invalid input(s): ALBUMIN, Coagulation Studies:   , Cardiac Studies:   , Additional Labs:   , iSTAT CHEM 8:       Invalid input(s): POTASSIUMIS, ABG:   , Toxicology:   , Last A1C/Lipid Panel/Thyroid Panel: No results found for: HGBA1C, TRIG, CHOL, HDL, LDLCALC, [id number], T3FREE, P2LPBBX, FREET4  Lab Results   Component Value Date    CRP 14 2 (H) 02/26/2021    SHANNON Positive (A) 02/26/2021    RF Negative 02/26/2021           Invalid input(s): URIBILINOGEN         Imaging: I have personally reviewed pertinent films in PACS

## 2021-05-05 NOTE — LETTER
May 5, 2021     Heber Castañeda PA-C  [address]  [address]    Patient: Eagle Longo   YOB: 1990   Date of Visit: 5/5/2021       Dear Dr Carty Slider: Thank you for referring Eagle Longo to me for evaluation  Below are my notes for this consultation  If you have questions, please do not hesitate to call me  I look forward to following your patient along with you  Sincerely,        Willi Raman MD        CC: No Recipients  Willi Raman MD  5/5/2021 10:44 AM  Signed  Rheumatology Consult   Eagle Longo 36 y o  female 1990    DATE: 5/5/2021    Reason for Consult: joint pain and psoriasis    Assessment and Plan:  Arthritis--Topical NSAIDs  Anti-inflammatory diet  Begin Plaquenil 200mg daily and titrate to 400mg daily  Baseline opthal eval   If symptoms do not improve then she likely will be a good candidate for [facility] for psoriatic arthritis  Repeat labs and f/u in 3 mos  xrays SI joints  History of Present Illness:  Eagle Longo is a 36 y o  female With psoriasis and worsening joint pain/swelling and stiffness involving her hands/wrists and elbows and feet  She has an elevated esr/crp and +SHANNON  Negative RF/CCP ab  She also has inflammatory spinal pain, dry eye/mouth  No symptoms to suggest SLE despite a +SHANNON  +malaise       Review of Systems  Review of Systems   Constitutional: Positive for fatigue  Negative for chills, fever and unexpected weight change  HENT: Negative for mouth sores and trouble swallowing  Eyes: Negative for pain and visual disturbance  Respiratory: Negative for cough and shortness of breath  Cardiovascular: Negative for chest pain and leg swelling  Gastrointestinal: Negative for abdominal pain, blood in stool, constipation, diarrhea and nausea  Musculoskeletal: Positive for arthralgias and back pain  Negative for joint swelling and myalgias  Skin: Negative for color change and rash  Neurological: Negative for weakness and numbness  Hematological: Negative for adenopathy  Psychiatric/Behavioral: Negative for sleep disturbance  Allergies  No Known Allergies    Current Medications      Past Medical History  Past Medical History:   Diagnosis Date    Psoriasis        Past Surgical History  Past Surgical History:   Procedure Laterality Date    CHOLECYSTECTOMY      GALLBLADDER SURGERY  2015    WISDOM TOOTH EXTRACTION         Family History  No known autoimmune or inflammatory diseases in the family  Family History   Problem Relation Age of Onset    Hypertension Mother    [facility] Arthritis Mother     Asthma Mother     Thyroid disease unspecified Mother     No Known Problems Sister     No Known Problems Brother     No Known Problems Daughter     Diabetes Maternal Grandmother     Thyroid disease unspecified Maternal Grandmother     Other Maternal Grandfather         heart problems    No Known Problems Sister     No Known Problems Brother     Asthma Family        Social History  Occupation: [employer] employee  Social History     Substance and Sexual Activity   Alcohol Use Yes    Comment: Socially     Social History     Substance and Sexual Activity   Drug Use No     Social History     Tobacco Use   Smoking Status Former Smoker   Smokeless Tobacco Never Used        Objective: There were no vitals taken for this visit  Physical Exam  Musculoskeletal:         General: Swelling and tenderness present  Right shoulder: She exhibits decreased range of motion  Left shoulder: She exhibits decreased range of motion  Left knee: She exhibits swelling  Tenderness found  Hands:             Lab Results: I have personally reviewed pertinent reports        CBC:   , Chemistry Profile:       Invalid input(s): ALBUMIN, Coagulation Studies:   , Cardiac Studies:   , Additional Labs:   , iSTAT CHEM 8:       Invalid input(s): POTASSIUMIS, ABG:   , Toxicology:   , Last A1C/Lipid Panel/Thyroid Panel: No results found for: HGBA1C, TRIG, CHOL, HDL, LDLCALC, [id number], T3FREE, Q6MPDPF, FREET4  Lab Results   Component Value Date    CRP 14 2 (H) 02/26/2021    SHANNON Positive (A) 02/26/2021    RF Negative 02/26/2021           Invalid input(s): URIBILINOGEN         Imaging: I have personally reviewed pertinent films in PACS

## 2021-05-05 NOTE — PATIENT INSTRUCTIONS
Keep using the Voltaren gel up to 4 times per day as needed  Keep eating healthy and exercising  Take the Plaquenil (hydroxychloroquine) one tablet every day and after 1 week take 2 tablets daily  I will have you come back to the clinic in 2 months

## 2021-05-10 ENCOUNTER — TELEPHONE (OUTPATIENT)
Dept: OBGYN CLINIC | Facility: HOSPITAL | Age: 31
End: 2021-05-10

## 2021-05-10 NOTE — TELEPHONE ENCOUNTER
Patient is calling in reference to    hydroxychloroquine (PLAQUENIL) 200 mg tablet [[id number]]     Which is making her nauseated, gives her diarrhea, and is causing dizziness  Patient has only taken it twice and does not want to continue if it is going to make her feel this way      Please advise    Ortiz Hay [phone]

## 2021-05-25 ENCOUNTER — IMMUNIZATIONS (OUTPATIENT)
Dept: FAMILY MEDICINE CLINIC | Facility: HOSPITAL | Age: 31
End: 2021-05-25

## 2021-05-25 DIAGNOSIS — Z23 ENCOUNTER FOR IMMUNIZATION: Primary | ICD-10-CM

## 2021-05-25 PROCEDURE — 0001A SARS-COV-2 / COVID-19 MRNA VACCINE (PFIZER-BIONTECH) 30 MCG: CPT

## 2021-05-25 PROCEDURE — 91300 SARS-COV-2 / COVID-19 MRNA VACCINE (PFIZER-BIONTECH) 30 MCG: CPT

## 2021-06-15 ENCOUNTER — IMMUNIZATIONS (OUTPATIENT)
Dept: FAMILY MEDICINE CLINIC | Facility: HOSPITAL | Age: 31
End: 2021-06-15

## 2021-06-15 DIAGNOSIS — Z23 ENCOUNTER FOR IMMUNIZATION: Primary | ICD-10-CM

## 2021-06-15 PROCEDURE — 91300 SARS-COV-2 / COVID-19 MRNA VACCINE (PFIZER-BIONTECH) 30 MCG: CPT

## 2021-06-15 PROCEDURE — 0002A SARS-COV-2 / COVID-19 MRNA VACCINE (PFIZER-BIONTECH) 30 MCG: CPT

## 2021-10-05 ENCOUNTER — APPOINTMENT (EMERGENCY)
Dept: RADIOLOGY | Facility: HOSPITAL | Age: 31
End: 2021-10-05
Attending: EMERGENCY MEDICINE
Payer: COMMERCIAL

## 2021-10-05 ENCOUNTER — HOSPITAL ENCOUNTER (EMERGENCY)
Facility: HOSPITAL | Age: 31
Discharge: HOME | End: 2021-10-05
Attending: EMERGENCY MEDICINE | Admitting: EMERGENCY MEDICINE
Payer: COMMERCIAL

## 2021-10-05 VITALS
RESPIRATION RATE: 20 BRPM | TEMPERATURE: 97.9 F | OXYGEN SATURATION: 99 % | SYSTOLIC BLOOD PRESSURE: 135 MMHG | HEART RATE: 80 BPM | DIASTOLIC BLOOD PRESSURE: 75 MMHG

## 2021-10-05 DIAGNOSIS — R10.31 RIGHT LOWER QUADRANT ABDOMINAL PAIN: Primary | ICD-10-CM

## 2021-10-05 DIAGNOSIS — R10.9 RIGHT FLANK PAIN: ICD-10-CM

## 2021-10-05 LAB
ALBUMIN SERPL BCP-MCNC: 4.3 G/DL (ref 3.5–5)
ALP SERPL-CCNC: 82 U/L (ref 46–116)
ALT SERPL W P-5'-P-CCNC: 64 U/L (ref 12–78)
ANION GAP SERPL CALCULATED.3IONS-SCNC: 5 MMOL/L (ref 4–13)
AST SERPL W P-5'-P-CCNC: 34 U/L (ref 5–45)
BACTERIA UR QL AUTO: NORMAL /HPF
BASOPHILS # BLD AUTO: 0.04 THOUSANDS/ΜL (ref 0–0.1)
BASOPHILS NFR BLD AUTO: 0 % (ref 0–1)
BILIRUB SERPL-MCNC: 0.51 MG/DL (ref 0.2–1)
BILIRUB UR QL STRIP: NEGATIVE
BUN SERPL-MCNC: 12 MG/DL (ref 5–25)
CALCIUM SERPL-MCNC: 10 MG/DL (ref 8.3–10.1)
CHLORIDE SERPL-SCNC: 104 MMOL/L (ref 100–108)
CLARITY UR: CLEAR
CO2 SERPL-SCNC: 27 MMOL/L (ref 21–32)
COLOR UR: YELLOW
CREAT SERPL-MCNC: 0.66 MG/DL (ref 0.6–1.3)
EOSINOPHIL # BLD AUTO: 0.07 THOUSAND/ΜL (ref 0–0.61)
EOSINOPHIL NFR BLD AUTO: 1 % (ref 0–6)
ERYTHROCYTE [DISTWIDTH] IN BLOOD BY AUTOMATED COUNT: 11.9 % (ref 11.6–15.1)
EXT PREG TEST URINE: NEGATIVE
EXT. CONTROL ED NAV: NORMAL
GFR SERPL CREATININE-BSD FRML MDRD: 120 ML/MIN/1.73SQ M
GLUCOSE SERPL-MCNC: 88 MG/DL (ref 65–140)
GLUCOSE UR STRIP-MCNC: NEGATIVE MG/DL
HCT VFR BLD AUTO: 42.6 % (ref 34.8–46.1)
HGB BLD-MCNC: 13.9 G/DL (ref 11.5–15.4)
HGB UR QL STRIP.AUTO: NEGATIVE
HYALINE CASTS #/AREA URNS LPF: NORMAL /LPF
IMM GRANULOCYTES # BLD AUTO: 0.03 THOUSAND/UL (ref 0–0.2)
IMM GRANULOCYTES NFR BLD AUTO: 0 % (ref 0–2)
KETONES UR STRIP-MCNC: NEGATIVE MG/DL
LEUKOCYTE ESTERASE UR QL STRIP: ABNORMAL
LIPASE SERPL-CCNC: 63 U/L (ref 73–393)
LYMPHOCYTES # BLD AUTO: 2.96 THOUSANDS/ΜL (ref 0.6–4.47)
LYMPHOCYTES NFR BLD AUTO: 27 % (ref 14–44)
MCH RBC QN AUTO: 30.5 PG (ref 26.8–34.3)
MCHC RBC AUTO-ENTMCNC: 32.6 G/DL (ref 31.4–37.4)
MCV RBC AUTO: 94 FL (ref 82–98)
MONOCYTES # BLD AUTO: 0.66 THOUSAND/ΜL (ref 0.17–1.22)
MONOCYTES NFR BLD AUTO: 6 % (ref 4–12)
NEUTROPHILS # BLD AUTO: 7.38 THOUSANDS/ΜL (ref 1.85–7.62)
NEUTS SEG NFR BLD AUTO: 66 % (ref 43–75)
NITRITE UR QL STRIP: NEGATIVE
NON-SQ EPI CELLS URNS QL MICRO: NORMAL /HPF
NRBC BLD AUTO-RTO: 0 /100 WBCS
PH UR STRIP.AUTO: 7.5 [PH] (ref 4.5–8)
PLATELET # BLD AUTO: 317 THOUSANDS/UL (ref 149–390)
PMV BLD AUTO: 10.1 FL (ref 8.9–12.7)
POTASSIUM SERPL-SCNC: 3.6 MMOL/L (ref 3.5–5.3)
PROT SERPL-MCNC: 8.6 G/DL (ref 6.4–8.2)
PROT UR STRIP-MCNC: NEGATIVE MG/DL
RBC # BLD AUTO: 4.55 MILLION/UL (ref 3.81–5.12)
RBC #/AREA URNS AUTO: NORMAL /HPF
SODIUM SERPL-SCNC: 136 MMOL/L (ref 136–145)
SP GR UR STRIP.AUTO: 1.02 (ref 1–1.03)
UROBILINOGEN UR QL STRIP.AUTO: 0.2 E.U./DL
WBC # BLD AUTO: 11.14 THOUSAND/UL (ref 4.31–10.16)
WBC #/AREA URNS AUTO: NORMAL /HPF

## 2021-10-05 PROCEDURE — 99284 EMERGENCY DEPT VISIT MOD MDM: CPT | Performed by: EMERGENCY MEDICINE

## 2021-10-05 PROCEDURE — 80053 COMPREHEN METABOLIC PANEL: CPT | Performed by: EMERGENCY MEDICINE

## 2021-10-05 PROCEDURE — 81025 URINE PREGNANCY TEST: CPT | Performed by: EMERGENCY MEDICINE

## 2021-10-05 PROCEDURE — 96372 THER/PROPH/DIAG INJ SC/IM: CPT

## 2021-10-05 PROCEDURE — 83690 ASSAY OF LIPASE: CPT | Performed by: EMERGENCY MEDICINE

## 2021-10-05 PROCEDURE — 85025 COMPLETE CBC W/AUTO DIFF WBC: CPT | Performed by: EMERGENCY MEDICINE

## 2021-10-05 PROCEDURE — G1004 CDSM NDSC: HCPCS

## 2021-10-05 PROCEDURE — 81001 URINALYSIS AUTO W/SCOPE: CPT

## 2021-10-05 PROCEDURE — 99284 EMERGENCY DEPT VISIT MOD MDM: CPT

## 2021-10-05 PROCEDURE — 74177 CT ABD & PELVIS W/CONTRAST: CPT

## 2021-10-05 PROCEDURE — 36415 COLL VENOUS BLD VENIPUNCTURE: CPT | Performed by: EMERGENCY MEDICINE

## 2021-10-05 RX ORDER — KETOROLAC TROMETHAMINE 30 MG/ML
15 INJECTION, SOLUTION INTRAMUSCULAR; INTRAVENOUS ONCE
Status: COMPLETED | OUTPATIENT
Start: 2021-10-05 | End: 2021-10-05

## 2021-10-05 RX ADMIN — KETOROLAC TROMETHAMINE 15 MG: 30 INJECTION, SOLUTION INTRAMUSCULAR at 16:51

## 2021-10-05 RX ADMIN — IOHEXOL 100 ML: 350 INJECTION, SOLUTION INTRAVENOUS at 18:12

## 2021-12-06 ENCOUNTER — APPOINTMENT (OUTPATIENT)
Dept: URGENT CARE | Facility: MEDICAL CENTER | Age: 31
End: 2021-12-06
Payer: OTHER MISCELLANEOUS

## 2021-12-06 PROCEDURE — G0382 LEV 3 HOSP TYPE B ED VISIT: HCPCS | Performed by: PHYSICIAN ASSISTANT

## 2021-12-06 PROCEDURE — 99283 EMERGENCY DEPT VISIT LOW MDM: CPT | Performed by: PHYSICIAN ASSISTANT

## 2021-12-17 ENCOUNTER — OFFICE VISIT (OUTPATIENT)
Dept: FAMILY MEDICINE CLINIC | Facility: CLINIC | Age: 31
End: 2021-12-17

## 2021-12-17 DIAGNOSIS — J06.9 VIRAL URI WITH COUGH: Primary | ICD-10-CM

## 2021-12-17 PROCEDURE — 99213 OFFICE O/P EST LOW 20 MIN: CPT | Performed by: FAMILY MEDICINE

## 2021-12-17 RX ORDER — OXYMETAZOLINE HYDROCHLORIDE 0.05 G/100ML
2 SPRAY NASAL 2 TIMES DAILY
Qty: 30 ML | Refills: 0 | Status: SHIPPED | OUTPATIENT
Start: 2021-12-17

## 2021-12-17 RX ORDER — GUAIFENESIN/DEXTROMETHORPHAN 100-10MG/5
5 SYRUP ORAL 3 TIMES DAILY PRN
Qty: 118 ML | Refills: 0 | Status: SHIPPED | OUTPATIENT
Start: 2021-12-17

## 2021-12-17 RX ORDER — GUAIFENESIN 600 MG
1200 TABLET, EXTENDED RELEASE 12 HR ORAL EVERY 12 HOURS SCHEDULED
Qty: 14 TABLET | Refills: 0 | Status: SHIPPED | OUTPATIENT
Start: 2021-12-17

## 2021-12-17 RX ORDER — IBUPROFEN 200 MG
400 TABLET ORAL EVERY 6 HOURS PRN
Qty: 28 TABLET | Refills: 0 | Status: SHIPPED | OUTPATIENT
Start: 2021-12-17

## 2021-12-17 RX ORDER — ACETAMINOPHEN 500 MG
1000 TABLET ORAL EVERY 8 HOURS PRN
Qty: 30 TABLET | Refills: 0 | Status: SHIPPED | OUTPATIENT
Start: 2021-12-17

## 2022-05-02 ENCOUNTER — OFFICE VISIT (OUTPATIENT)
Dept: RHEUMATOLOGY | Facility: CLINIC | Age: 32
End: 2022-05-02
Payer: COMMERCIAL

## 2022-05-02 VITALS
SYSTOLIC BLOOD PRESSURE: 122 MMHG | DIASTOLIC BLOOD PRESSURE: 76 MMHG | BODY MASS INDEX: 33.29 KG/M2 | HEIGHT: 64 IN | WEIGHT: 195 LBS

## 2022-05-02 DIAGNOSIS — L40.50 PSORIATIC ARTHRITIS (HCC): Primary | ICD-10-CM

## 2022-05-02 PROCEDURE — 99214 OFFICE O/P EST MOD 30 MIN: CPT | Performed by: INTERNAL MEDICINE

## 2022-05-02 NOTE — PATIENT INSTRUCTIONS
I am going to start the authorization process for [facility] thru your insurance carrier  Keep exercising and eating healthy  Take the ibuprofen as needed for joint pain

## 2022-05-02 NOTE — PROGRESS NOTES
Assessment and Plan:   Psoriatic arthritis with dactylitis--begin Otezla as patient failed ibuprofen, prednisone and hydroxychloroquine  She is unable to take methotrexate as she may wish to become pregnant in the near future  She has received her COVID vaccine  She is amenable to beginning [place]  Rheumatic Disease Summary  As above     Here for f/u visit  She has psoriatic arthritis, psoriasis and elevated CRP  She was intolerant to Plaquenil and has failed oral NSAIDs/topical NSAIDs  MTX is CI given possibility of becoming pregnant in the near future  +joint pain, swelling and stiffness  +scalp psoriasis, elbows/knees  Mild left foot/heel pain  The following portions of the patient's history were reviewed and updated as appropriate: allergies, current medications, past family history, past medical history, past social history, past surgical history and problem list     Review of Systems:   Review of Systems   Constitutional: Positive for fatigue  HENT: Negative for mouth sores  Eyes: Negative for pain  Respiratory: Negative for shortness of breath  Cardiovascular: Negative for leg swelling  Musculoskeletal: Positive for arthralgias  Negative for joint swelling  Skin: Negative for rash  Neurological: Negative for weakness  Hematological: Negative for adenopathy  Psychiatric/Behavioral: Negative for sleep disturbance         Home Medications:    Current Outpatient Medications:     levonorgestrel (MIRENA, 52 MG,) 20 MCG/24HR IUD, by Intrauterine route, Disp: , Rfl:     triamcinolone (KENALOG) 0 1 % ointment, Apply topically 2 (two) times a day, Disp: 80 g, Rfl: 1    acetaminophen (TYLENOL) 500 mg tablet, Take 500 mg by mouth every 6 (six) hours as needed for mild pain, Disp: , Rfl:     acetaminophen (TYLENOL) 500 mg tablet, Take 2 tablets (1,000 mg total) by mouth every 8 (eight) hours as needed for mild pain, Disp: 30 tablet, Rfl: 0    dextromethorphan-guaiFENesin (ROBITUSSIN DM)  mg/5 mL syrup, Take 5 mL by mouth 3 (three) times a day as needed for cough (Patient not taking: Reported on 5/2/2022 ), Disp: 118 mL, Rfl: 0    guaiFENesin (MUCINEX) 600 mg 12 hr tablet, Take 2 tablets (1,200 mg total) by mouth every 12 (twelve) hours (Patient not taking: Reported on 5/2/2022 ), Disp: 14 tablet, Rfl: 0    hydroxychloroquine (PLAQUENIL) 200 mg tablet, Take 1 tablet (200 mg total) by mouth 2 (two) times a day with meals, Disp: 60 tablet, Rfl: 5    ibuprofen (MOTRIN) 200 mg tablet, Take by mouth every 6 (six) hours as needed for mild pain (Patient not taking: Reported on 5/2/2022 ), Disp: , Rfl:     ibuprofen (MOTRIN) 200 mg tablet, Take 2 tablets (400 mg total) by mouth every 6 (six) hours as needed for mild pain (Patient not taking: Reported on 5/2/2022 ), Disp: 28 tablet, Rfl: 0    naproxen (NAPROSYN) 250 mg tablet, Take 250 mg by mouth 2 (two) times a day with meals (Patient not taking: Reported on 5/2/2022 ), Disp: , Rfl:     oxymetazoline (AFRIN) 0 05 % nasal spray, 2 sprays by Each Nare route 2 (two) times a day Do NOT use longer than 5 days (Patient not taking: Reported on 5/2/2022 ), Disp: 30 mL, Rfl: 0    Objective:    Vitals:    05/02/22 1427   BP: 122/76   Weight: 88 5 kg (195 lb)   Height: 5' 4" (1 626 m)       Physical Exam  Musculoskeletal:      Right hand: Swelling and tenderness present  Decreased range of motion  Left foot: Tenderness present  Skin:     Findings: Rash present  Rash is macular and scaling  Comments: +psoriasis  Reviewed labs and imaging  Imaging:   No results found      Labs:   Admission on 10/05/2021, Discharged on 10/05/2021   Component Date Value Ref Range Status    Color, UA 10/05/2021 Yellow   Final    Clarity, UA 10/05/2021 Clear   Final    pH, UA 10/05/2021 7 5  4 5 - 8 0 Final    Leukocytes, UA 10/05/2021 Trace* Negative Final    Nitrite, UA 10/05/2021 Negative  Negative Final    Protein, UA 10/05/2021 Negative  Negative mg/dl Final    Glucose, UA 10/05/2021 Negative  Negative mg/dl Final    Ketones, UA 10/05/2021 Negative  Negative mg/dl Final    Urobilinogen, UA 10/05/2021 0 2  0 2, 1 0 E U /dl E U /dl Final    Bilirubin, UA 10/05/2021 Negative  Negative Final    Blood, UA 10/05/2021 Negative  Negative Final    Specific [place], UA 10/05/2021 1 020  1 003 - 1 030 Final    RBC, UA 10/05/2021 None Seen  None Seen, 2-4 /hpf Final    WBC, UA 10/05/2021 None Seen  None Seen, 2-4, 5-60 /hpf Final    Epithelial Cells 10/05/2021 None Seen  None Seen, Occasional /hpf Final    Bacteria, UA 10/05/2021 None Seen  None Seen, Occasional /hpf Final    Hyaline Casts, UA 10/05/2021 None Seen  None Seen /lpf Final    EXT PREG TEST UR (Ref: Negative) 10/05/2021 negative   Final    Control 10/05/2021 valid   Final    WBC 10/05/2021 11 14* 4 31 - 10 16 Thousand/uL Final    RBC 10/05/2021 4 55  3 81 - 5 12 Million/uL Final    Hemoglobin 10/05/2021 13 9  11 5 - 15 4 g/dL Final    Hematocrit 10/05/2021 42 6  34 8 - 46 1 % Final    MCV 10/05/2021 94  82 - 98 fL Final    MCH 10/05/2021 30 5  26 8 - 34 3 pg Final    MCHC 10/05/2021 32 6  31 4 - 37 4 g/dL Final    RDW 10/05/2021 11 9  11 6 - 15 1 % Final    MPV 10/05/2021 10 1  8 9 - 12 7 fL Final    Platelets 09/23/4973 317  149 - 390 Thousands/uL Final    nRBC 10/05/2021 0  /100 WBCs Final    Neutrophils Relative 10/05/2021 66  43 - 75 % Final    Immat GRANS % 10/05/2021 0  0 - 2 % Final    Lymphocytes Relative 10/05/2021 27  14 - 44 % Final    Monocytes Relative 10/05/2021 6  4 - 12 % Final    Eosinophils Relative 10/05/2021 1  0 - 6 % Final    Basophils Relative 10/05/2021 0  0 - 1 % Final    Neutrophils Absolute 10/05/2021 7 38  1 85 - 7 62 Thousands/µL Final    Immature Grans Absolute 10/05/2021 0 03  0 00 - 0 20 Thousand/uL Final    Lymphocytes Absolute 10/05/2021 2 96  0 60 - 4 47 Thousands/µL Final    Monocytes Absolute 10/05/2021 0 66  0 17 - 1 22 Thousand/µL Final    Eosinophils Absolute 10/05/2021 0 07  0 00 - 0 61 Thousand/µL Final    Basophils Absolute 10/05/2021 0 04  0 00 - 0 10 Thousands/µL Final    Sodium 10/05/2021 136  136 - 145 mmol/L Final    Potassium 10/05/2021 3 6  3 5 - 5 3 mmol/L Final    Chloride 10/05/2021 104  100 - 108 mmol/L Final    CO2 10/05/2021 27  21 - 32 mmol/L Final    ANION GAP 10/05/2021 5  4 - 13 mmol/L Final    BUN 10/05/2021 12  5 - 25 mg/dL Final    Creatinine 10/05/2021 0 66  0 60 - 1 30 mg/dL Final    Standardized to IDMS reference method    Glucose 10/05/2021 88  65 - 140 mg/dL Final    If the patient is fasting, the ADA then defines impaired fasting glucose as > 100 mg/dL and diabetes as > or equal to 123 mg/dL  Specimen collection should occur prior to Sulfasalazine administration due to the potential for falsely depressed results  Specimen collection should occur prior to Sulfapyridine administration due to the potential for falsely elevated results   Calcium 10/05/2021 10 0  8 3 - 10 1 mg/dL Final    AST 10/05/2021 34  5 - 45 U/L Final    Specimen collection should occur prior to Sulfasalazine administration due to the potential for falsely depressed results   ALT 10/05/2021 64  12 - 78 U/L Final    Specimen collection should occur prior to Sulfasalazine and/or Sulfapyridine administration due to the potential for falsely depressed results   Alkaline Phosphatase 10/05/2021 82  46 - 116 U/L Final    Total Protein 10/05/2021 8 6* 6 4 - 8 2 g/dL Final    Albumin 10/05/2021 4 3  3 5 - 5 0 g/dL Final    Total Bilirubin 10/05/2021 0 51  0 20 - 1 00 mg/dL Final    Use of this assay is not recommended for patients undergoing treatment with eltrombopag due to the potential for falsely elevated results      eGFR 10/05/2021 120  ml/min/1 73sq m Final    Lipase 10/05/2021 63* 73 - 393 u/L Final

## 2022-05-04 DIAGNOSIS — L40.50 PSORIATIC ARTHRITIS (HCC): Primary | ICD-10-CM

## 2022-09-07 ENCOUNTER — TELEPHONE (OUTPATIENT)
Dept: OBGYN CLINIC | Facility: HOSPITAL | Age: 32
End: 2022-09-07

## 2022-09-07 ENCOUNTER — OFFICE VISIT (OUTPATIENT)
Dept: RHEUMATOLOGY | Facility: CLINIC | Age: 32
End: 2022-09-07
Payer: COMMERCIAL

## 2022-09-07 VITALS
DIASTOLIC BLOOD PRESSURE: 78 MMHG | WEIGHT: 180.4 LBS | HEIGHT: 64 IN | SYSTOLIC BLOOD PRESSURE: 120 MMHG | BODY MASS INDEX: 30.8 KG/M2

## 2022-09-07 DIAGNOSIS — L40.50 PSORIATIC ARTHRITIS (HCC): ICD-10-CM

## 2022-09-07 PROCEDURE — 99214 OFFICE O/P EST MOD 30 MIN: CPT | Performed by: INTERNAL MEDICINE

## 2022-09-07 RX ORDER — METHYLPREDNISOLONE 4 MG/1
TABLET ORAL
Qty: 30 TABLET | Refills: 2 | Status: SHIPPED | OUTPATIENT
Start: 2022-09-07 | End: 2022-09-22

## 2022-09-07 NOTE — PATIENT INSTRUCTIONS
Resume the [facility] and take the medication with food or after a meal whenever your stomach is the most settled  Take Tylenol as needed for any headaches  Take the methylprednisolone as needed for any arthritis flares

## 2022-09-07 NOTE — TELEPHONE ENCOUNTER
[facility] is calling in reference to the [address] [facility] starter pack  Confirming that the patient needs this since she is already on the medication and on a maintenance dose  Please advise      [facility] [phone]

## 2022-09-07 NOTE — PROGRESS NOTES
Assessment and Plan:   Psoriatic arthritis--resume eventblimp with a new starter pack  Tylenol PRN headaches  Medrol PRN arthritis flares  Topical NSAIDs  Anti-inflammatory diet/exercise  If unable to tolerate Otezla then will likely transition to anti-TNF tx  F/u in 3-4 mos  or sooner if necessary      Rheumatic Disease Summary  As above    Here for f/u visit  +nausea and headaches on eventblimp  She self d/c'd the medication  She takes naproxen PRN joint pain with some relief of her symptoms  She also uses Voltaren gel PRN joint pain  The following portions of the patient's history were reviewed and updated as appropriate: allergies, current medications, past family history, past medical history, past social history, past surgical history and problem list     Review of Systems:   Review of Systems   Constitutional: Negative for fatigue  HENT: Negative for mouth sores  Eyes: Negative for pain  Respiratory: Negative for shortness of breath  Cardiovascular: Negative for leg swelling  Musculoskeletal: Positive for arthralgias, back pain and joint swelling  Skin: Negative for rash  Neurological: Negative for weakness  Hematological: Negative for adenopathy  Psychiatric/Behavioral: Negative for sleep disturbance  Home Medications:    Current Outpatient Medications:     Apremilast 10 & 20 & 30 MG TBPK, Use as directed, Disp: 1 each, Rfl: 1    Apremilast 30 MG TABS, Take 1 tablet by mouth 2 (two) times a day, Disp: 180 tablet, Rfl: 1    methylPREDNISolone (MEDROL) 4 mg tablet, Take 3 tablets (12 mg total) by mouth daily for 5 days, THEN 2 tablets (8 mg total) daily for 5 days, THEN 1 tablet (4 mg total) daily for 5 days  , Disp: 30 tablet, Rfl: 2    acetaminophen (TYLENOL) 500 mg tablet, Take 500 mg by mouth every 6 (six) hours as needed for mild pain, Disp: , Rfl:     acetaminophen (TYLENOL) 500 mg tablet, Take 2 tablets (1,000 mg total) by mouth every 8 (eight) hours as needed for mild pain, Disp: 30 tablet, Rfl: 0    dextromethorphan-guaiFENesin (ROBITUSSIN DM)  mg/5 mL syrup, Take 5 mL by mouth 3 (three) times a day as needed for cough (Patient not taking: Reported on 5/2/2022 ), Disp: 118 mL, Rfl: 0    guaiFENesin (MUCINEX) 600 mg 12 hr tablet, Take 2 tablets (1,200 mg total) by mouth every 12 (twelve) hours (Patient not taking: Reported on 5/2/2022 ), Disp: 14 tablet, Rfl: 0    hydroxychloroquine (PLAQUENIL) 200 mg tablet, Take 1 tablet (200 mg total) by mouth 2 (two) times a day with meals, Disp: 60 tablet, Rfl: 5    ibuprofen (MOTRIN) 200 mg tablet, Take by mouth every 6 (six) hours as needed for mild pain (Patient not taking: Reported on 5/2/2022 ), Disp: , Rfl:     ibuprofen (MOTRIN) 200 mg tablet, Take 2 tablets (400 mg total) by mouth every 6 (six) hours as needed for mild pain (Patient not taking: Reported on 5/2/2022 ), Disp: 28 tablet, Rfl: 0    levonorgestrel (MIRENA, 52 MG,) 20 MCG/24HR IUD, by Intrauterine route, Disp: , Rfl:     naproxen (NAPROSYN) 250 mg tablet, Take 250 mg by mouth 2 (two) times a day with meals (Patient not taking: Reported on 5/2/2022 ), Disp: , Rfl:     oxymetazoline (AFRIN) 0 05 % nasal spray, 2 sprays by Each Nare route 2 (two) times a day Do NOT use longer than 5 days (Patient not taking: Reported on 5/2/2022 ), Disp: 30 mL, Rfl: 0    triamcinolone (KENALOG) 0 1 % ointment, Apply topically 2 (two) times a day, Disp: 80 g, Rfl: 1    Objective:    Vitals:    09/07/22 1041   BP: 120/78   Weight: 81 8 kg (180 lb 6 4 oz)   Height: 5' 4" (1 626 m)       Physical Exam  Musculoskeletal:      Right elbow: Tenderness present in lateral epicondyle  Right hand: Swelling present  Decreased range of motion  Left hand: Swelling and tenderness present  Decreased range of motion  Reviewed labs and imaging  Imaging:   No results found      Labs:   Admission on 10/05/2021, Discharged on 10/05/2021   Component Date Value Ref Range Status    Color, UA 10/05/2021 Yellow   Final    Clarity, UA 10/05/2021 Clear   Final    pH, UA 10/05/2021 7 5  4 5 - 8 0 Final    Leukocytes, UA 10/05/2021 Trace (A) Negative Final    Nitrite, UA 10/05/2021 Negative  Negative Final    Protein, UA 10/05/2021 Negative  Negative mg/dl Final    Glucose, UA 10/05/2021 Negative  Negative mg/dl Final    Ketones, UA 10/05/2021 Negative  Negative mg/dl Final    Urobilinogen, UA 10/05/2021 0 2  0 2, 1 0 E U /dl E U /dl Final    Bilirubin, UA 10/05/2021 Negative  Negative Final    Occult Blood, UA 10/05/2021 Negative  Negative Final    Specific [place], UA 10/05/2021 1 020  1 003 - 1 030 Final    RBC, UA 10/05/2021 None Seen  None Seen, 2-4 /hpf Final    WBC, UA 10/05/2021 None Seen  None Seen, 2-4, 5-60 /hpf Final    Epithelial Cells 10/05/2021 None Seen  None Seen, Occasional /hpf Final    Bacteria, UA 10/05/2021 None Seen  None Seen, Occasional /hpf Final    Hyaline Casts, UA 10/05/2021 None Seen  None Seen /lpf Final    EXT PREG TEST UR (Ref: Negative) 10/05/2021 negative   Final    Control 10/05/2021 valid   Final    WBC 10/05/2021 11 14 (A) 4 31 - 10 16 Thousand/uL Final    RBC 10/05/2021 4 55  3 81 - 5 12 Million/uL Final    Hemoglobin 10/05/2021 13 9  11 5 - 15 4 g/dL Final    Hematocrit 10/05/2021 42 6  34 8 - 46 1 % Final    MCV 10/05/2021 94  82 - 98 fL Final    MCH 10/05/2021 30 5  26 8 - 34 3 pg Final    MCHC 10/05/2021 32 6  31 4 - 37 4 g/dL Final    RDW 10/05/2021 11 9  11 6 - 15 1 % Final    MPV 10/05/2021 10 1  8 9 - 12 7 fL Final    Platelets 02/77/2843 317  149 - 390 Thousands/uL Final    nRBC 10/05/2021 0  /100 WBCs Final    Neutrophils Relative 10/05/2021 66  43 - 75 % Final    Immat GRANS % 10/05/2021 0  0 - 2 % Final    Lymphocytes Relative 10/05/2021 27  14 - 44 % Final    Monocytes Relative 10/05/2021 6  4 - 12 % Final    Eosinophils Relative 10/05/2021 1  0 - 6 % Final    Basophils Relative 10/05/2021 0  0 - 1 % Final    Neutrophils Absolute 10/05/2021 7 38  1 85 - 7 62 Thousands/µL Final    Immature Grans Absolute 10/05/2021 0 03  0 00 - 0 20 Thousand/uL Final    Lymphocytes Absolute 10/05/2021 2 96  0 60 - 4 47 Thousands/µL Final    Monocytes Absolute 10/05/2021 0 66  0 17 - 1 22 Thousand/µL Final    Eosinophils Absolute 10/05/2021 0 07  0 00 - 0 61 Thousand/µL Final    Basophils Absolute 10/05/2021 0 04  0 00 - 0 10 Thousands/µL Final    Sodium 10/05/2021 136  136 - 145 mmol/L Final    Potassium 10/05/2021 3 6  3 5 - 5 3 mmol/L Final    Chloride 10/05/2021 104  100 - 108 mmol/L Final    CO2 10/05/2021 27  21 - 32 mmol/L Final    ANION GAP 10/05/2021 5  4 - 13 mmol/L Final    BUN 10/05/2021 12  5 - 25 mg/dL Final    Creatinine 10/05/2021 0 66  0 60 - 1 30 mg/dL Final    Standardized to IDMS reference method    Glucose 10/05/2021 88  65 - 140 mg/dL Final    If the patient is fasting, the ADA then defines impaired fasting glucose as > 100 mg/dL and diabetes as > or equal to 123 mg/dL  Specimen collection should occur prior to Sulfasalazine administration due to the potential for falsely depressed results  Specimen collection should occur prior to Sulfapyridine administration due to the potential for falsely elevated results   Calcium 10/05/2021 10 0  8 3 - 10 1 mg/dL Final    AST 10/05/2021 34  5 - 45 U/L Final    Specimen collection should occur prior to Sulfasalazine administration due to the potential for falsely depressed results   ALT 10/05/2021 64  12 - 78 U/L Final    Specimen collection should occur prior to Sulfasalazine and/or Sulfapyridine administration due to the potential for falsely depressed results       Alkaline Phosphatase 10/05/2021 82  46 - 116 U/L Final    Total Protein 10/05/2021 8 6 (A) 6 4 - 8 2 g/dL Final    Albumin 10/05/2021 4 3  3 5 - 5 0 g/dL Final    Total Bilirubin 10/05/2021 0 51  0 20 - 1 00 mg/dL Final    Use of this assay is not recommended for patients undergoing treatment with eltrombopag due to the potential for falsely elevated results      eGFR 10/05/2021 120  ml/min/1 73sq m Final    Lipase 10/05/2021 63 (A) 73 - 393 u/L Final

## 2022-09-09 ENCOUNTER — TELEPHONE (OUTPATIENT)
Dept: OBGYN CLINIC | Facility: HOSPITAL | Age: 32
End: 2022-09-09

## 2022-09-09 NOTE — TELEPHONE ENCOUNTER
[facility] called   # [phone]   Regarding Apremilast 10 & 20 & 30 MG TBPK [[id number]]     Order Details  Dose, Route, Frequency: As Directed   Dispense Quantity: 1 each Refills: 1      If medication is still needed?     Pharmacy    [facility] [address] [address]   Phone:  [phone]  Fax:  [phone]

## 2022-09-12 NOTE — TELEPHONE ENCOUNTER
Pharmacy was called and told the patient does need the starter pack due to stopping her original order of medication

## 2022-10-12 PROBLEM — J06.9 VIRAL URI WITH COUGH: Status: RESOLVED | Noted: 2021-12-17 | Resolved: 2022-10-12

## 2022-12-14 ENCOUNTER — APPOINTMENT (OUTPATIENT)
Dept: LAB | Facility: CLINIC | Age: 32
End: 2022-12-14

## 2022-12-14 ENCOUNTER — OFFICE VISIT (OUTPATIENT)
Dept: RHEUMATOLOGY | Facility: CLINIC | Age: 32
End: 2022-12-14

## 2022-12-14 VITALS
WEIGHT: 181.6 LBS | SYSTOLIC BLOOD PRESSURE: 124 MMHG | DIASTOLIC BLOOD PRESSURE: 70 MMHG | HEIGHT: 64 IN | BODY MASS INDEX: 31 KG/M2

## 2022-12-14 DIAGNOSIS — M25.512 BILATERAL SHOULDER PAIN, UNSPECIFIED CHRONICITY: ICD-10-CM

## 2022-12-14 DIAGNOSIS — R53.83 MALAISE AND FATIGUE: ICD-10-CM

## 2022-12-14 DIAGNOSIS — L40.50 PSORIATIC ARTHRITIS (HCC): Primary | ICD-10-CM

## 2022-12-14 DIAGNOSIS — L40.50 PSORIATIC ARTHRITIS (HCC): ICD-10-CM

## 2022-12-14 DIAGNOSIS — R53.81 MALAISE AND FATIGUE: ICD-10-CM

## 2022-12-14 DIAGNOSIS — G89.29 ELBOW PAIN, CHRONIC, UNSPECIFIED LATERALITY: ICD-10-CM

## 2022-12-14 DIAGNOSIS — L40.9 PSORIASIS: ICD-10-CM

## 2022-12-14 DIAGNOSIS — M25.529 ELBOW PAIN, CHRONIC, UNSPECIFIED LATERALITY: ICD-10-CM

## 2022-12-14 DIAGNOSIS — M25.532 LEFT WRIST PAIN: ICD-10-CM

## 2022-12-14 DIAGNOSIS — M25.511 BILATERAL SHOULDER PAIN, UNSPECIFIED CHRONICITY: ICD-10-CM

## 2022-12-14 LAB
ALBUMIN SERPL BCP-MCNC: 3.8 G/DL (ref 3.5–5)
ALP SERPL-CCNC: 73 U/L (ref 46–116)
ALT SERPL W P-5'-P-CCNC: 33 U/L (ref 12–78)
ANION GAP SERPL CALCULATED.3IONS-SCNC: 2 MMOL/L (ref 4–13)
AST SERPL W P-5'-P-CCNC: 18 U/L (ref 5–45)
BASOPHILS # BLD AUTO: 0.04 THOUSANDS/ÂΜL (ref 0–0.1)
BASOPHILS NFR BLD AUTO: 0 % (ref 0–1)
BILIRUB SERPL-MCNC: 0.4 MG/DL (ref 0.2–1)
BUN SERPL-MCNC: 12 MG/DL (ref 5–25)
CALCIUM SERPL-MCNC: 9.1 MG/DL (ref 8.3–10.1)
CHLORIDE SERPL-SCNC: 108 MMOL/L (ref 96–108)
CO2 SERPL-SCNC: 28 MMOL/L (ref 21–32)
CREAT SERPL-MCNC: 0.64 MG/DL (ref 0.6–1.3)
CRP SERPL QL: 7 MG/L
EOSINOPHIL # BLD AUTO: 0.06 THOUSAND/ÂΜL (ref 0–0.61)
EOSINOPHIL NFR BLD AUTO: 1 % (ref 0–6)
ERYTHROCYTE [DISTWIDTH] IN BLOOD BY AUTOMATED COUNT: 11.9 % (ref 11.6–15.1)
ERYTHROCYTE [SEDIMENTATION RATE] IN BLOOD: 18 MM/HOUR (ref 0–19)
GFR SERPL CREATININE-BSD FRML MDRD: 120 ML/MIN/1.73SQ M
GLUCOSE P FAST SERPL-MCNC: 105 MG/DL (ref 65–99)
HCT VFR BLD AUTO: 40.2 % (ref 34.8–46.1)
HGB BLD-MCNC: 13 G/DL (ref 11.5–15.4)
IMM GRANULOCYTES # BLD AUTO: 0.05 THOUSAND/UL (ref 0–0.2)
IMM GRANULOCYTES NFR BLD AUTO: 1 % (ref 0–2)
LYMPHOCYTES # BLD AUTO: 2.38 THOUSANDS/ÂΜL (ref 0.6–4.47)
LYMPHOCYTES NFR BLD AUTO: 23 % (ref 14–44)
MCH RBC QN AUTO: 30.4 PG (ref 26.8–34.3)
MCHC RBC AUTO-ENTMCNC: 32.3 G/DL (ref 31.4–37.4)
MCV RBC AUTO: 94 FL (ref 82–98)
MONOCYTES # BLD AUTO: 0.54 THOUSAND/ÂΜL (ref 0.17–1.22)
MONOCYTES NFR BLD AUTO: 5 % (ref 4–12)
NEUTROPHILS # BLD AUTO: 7.37 THOUSANDS/ÂΜL (ref 1.85–7.62)
NEUTS SEG NFR BLD AUTO: 70 % (ref 43–75)
NRBC BLD AUTO-RTO: 0 /100 WBCS
PLATELET # BLD AUTO: 338 THOUSANDS/UL (ref 149–390)
PMV BLD AUTO: 10.1 FL (ref 8.9–12.7)
POTASSIUM SERPL-SCNC: 3.6 MMOL/L (ref 3.5–5.3)
PROT SERPL-MCNC: 7.6 G/DL (ref 6.4–8.4)
RBC # BLD AUTO: 4.28 MILLION/UL (ref 3.81–5.12)
SODIUM SERPL-SCNC: 138 MMOL/L (ref 135–147)
TSH SERPL DL<=0.05 MIU/L-ACNC: 0.82 UIU/ML (ref 0.45–4.5)
VIT B12 SERPL-MCNC: 486 PG/ML (ref 100–900)
WBC # BLD AUTO: 10.44 THOUSAND/UL (ref 4.31–10.16)

## 2022-12-14 RX ORDER — MELOXICAM 15 MG/1
TABLET ORAL
COMMUNITY
Start: 2022-11-10

## 2022-12-14 NOTE — PATIENT INSTRUCTIONS
Try to increase the Otezla to twice a day as this will help with your arthritis and psoriasis  Take the meloxicam every day for the next 2-3 weeks for your tendinitis and shoulder pain  Please have your blood work done today  It is non-fasting  Use the Voltaren gel on your wrist and elbows up to 4 times per day

## 2022-12-14 NOTE — PROGRESS NOTES
Assessment and Plan:   Psoriatic arthritis  Anti-inflammatory diet/exercise  Increase Otezla to BID  Shoulder bursitis, epicondylitis, left wrist tendinitis  Topical NSAIDs, cool compresses and standing meloxicam for 2-4 weeks  Check labs today including TSH and B12 as patient with malaise  F/u in 6 mos  or sooner if necessary        Rheumatic Disease Summary  As above    Here for f/u visit  Bilateral elbow, shoulder and left wrist pain  S/p CS injection left wrist with some improvement in symptoms  Still with psoriasis, however, improving  Only taking Otezla QD  Some mild nausea  Will have labs done today  The following portions of the patient's history were reviewed and updated as appropriate: allergies, current medications, past family history, past medical history, past social history, past surgical history and problem list     Review of Systems:   Review of Systems   Constitutional: Positive for fatigue  HENT: Negative for mouth sores  Eyes: Negative for pain  Respiratory: Negative for shortness of breath  Cardiovascular: Negative for leg swelling  Musculoskeletal: Positive for arthralgias  Negative for joint swelling  Skin: Negative for rash  Neurological: Negative for weakness  Hematological: Negative for adenopathy  Psychiatric/Behavioral: Negative for sleep disturbance         Home Medications:    Current Outpatient Medications:   •  acetaminophen (TYLENOL) 500 mg tablet, Take 500 mg by mouth every 6 (six) hours as needed for mild pain, Disp: , Rfl:   •  acetaminophen (TYLENOL) 500 mg tablet, Take 2 tablets (1,000 mg total) by mouth every 8 (eight) hours as needed for mild pain, Disp: 30 tablet, Rfl: 0  •  Apremilast 10 & 20 & 30 MG TBPK, Use as directed, Disp: 1 each, Rfl: 1  •  Apremilast 30 MG TABS, Take 1 tablet by mouth 2 (two) times a day, Disp: 180 tablet, Rfl: 1  •  dextromethorphan-guaiFENesin (ROBITUSSIN DM)  mg/5 mL syrup, Take 5 mL by mouth 3 (three) times a day as needed for cough (Patient not taking: Reported on 5/2/2022 ), Disp: 118 mL, Rfl: 0  •  guaiFENesin (MUCINEX) 600 mg 12 hr tablet, Take 2 tablets (1,200 mg total) by mouth every 12 (twelve) hours (Patient not taking: Reported on 5/2/2022 ), Disp: 14 tablet, Rfl: 0  •  hydroxychloroquine (PLAQUENIL) 200 mg tablet, Take 1 tablet (200 mg total) by mouth 2 (two) times a day with meals, Disp: 60 tablet, Rfl: 5  •  ibuprofen (MOTRIN) 200 mg tablet, Take by mouth every 6 (six) hours as needed for mild pain (Patient not taking: Reported on 5/2/2022 ), Disp: , Rfl:   •  ibuprofen (MOTRIN) 200 mg tablet, Take 2 tablets (400 mg total) by mouth every 6 (six) hours as needed for mild pain (Patient not taking: Reported on 5/2/2022 ), Disp: 28 tablet, Rfl: 0  •  levonorgestrel (MIRENA, 52 MG,) 20 MCG/24HR IUD, by Intrauterine route, Disp: , Rfl:   •  naproxen (NAPROSYN) 250 mg tablet, Take 250 mg by mouth 2 (two) times a day with meals (Patient not taking: Reported on 5/2/2022 ), Disp: , Rfl:   •  oxymetazoline (AFRIN) 0 05 % nasal spray, 2 sprays by Each Nare route 2 (two) times a day Do NOT use longer than 5 days (Patient not taking: Reported on 5/2/2022 ), Disp: 30 mL, Rfl: 0  •  triamcinolone (KENALOG) 0 1 % ointment, Apply topically 2 (two) times a day, Disp: 80 g, Rfl: 1    Objective: There were no vitals filed for this visit  Physical Exam  Constitutional:       General: She is not in acute distress  HENT:      Head: Normocephalic and atraumatic  Eyes:      Conjunctiva/sclera: Conjunctivae normal    Cardiovascular:      Rate and Rhythm: Normal rate and regular rhythm  Heart sounds: S1 normal and S2 normal      No friction rub  Pulmonary:      Effort: Pulmonary effort is normal  No respiratory distress  Breath sounds: Normal breath sounds  No wheezing, rhonchi or rales  Musculoskeletal:      Right shoulder: Tenderness present  Decreased range of motion  Left shoulder: Tenderness present  Decreased range of motion  Right elbow: Tenderness present in medial epicondyle and lateral epicondyle  Left elbow: Tenderness present in medial epicondyle and lateral epicondyle  Left wrist: Tenderness present  Decreased range of motion  Cervical back: Neck supple  Comments: +impingement sign, bilaterally   Skin:     Coloration: Skin is not pale  Neurological:      Mental Status: She is alert  Mental status is at baseline  Psychiatric:         Mood and Affect: Mood normal          Behavior: Behavior normal          Reviewed labs and imaging  Imaging:   No results found  Labs:   No visits with results within 12 Month(s) from this visit     Latest known visit with results is:   Admission on 10/05/2021, Discharged on 10/05/2021   Component Date Value Ref Range Status   • Color, UA 10/05/2021 Yellow   Final   • Clarity, UA 10/05/2021 Clear   Final   • pH, UA 10/05/2021 7 5  4 5 - 8 0 Final   • Leukocytes, UA 10/05/2021 Trace (A)  Negative Final   • Nitrite, UA 10/05/2021 Negative  Negative Final   • Protein, UA 10/05/2021 Negative  Negative mg/dl Final   • Glucose, UA 10/05/2021 Negative  Negative mg/dl Final   • Ketones, UA 10/05/2021 Negative  Negative mg/dl Final   • Urobilinogen, UA 10/05/2021 0 2  0 2, 1 0 E U /dl E U /dl Final   • Bilirubin, UA 10/05/2021 Negative  Negative Final   • Occult Blood, UA 10/05/2021 Negative  Negative Final   • Specific Gravity, UA 10/05/2021 1 020  1 003 - 1 030 Final   • RBC, UA 10/05/2021 None Seen  None Seen, 2-4 /hpf Final   • WBC, UA 10/05/2021 None Seen  None Seen, 2-4, 5-60 /hpf Final   • Epithelial Cells 10/05/2021 None Seen  None Seen, Occasional /hpf Final   • Bacteria, UA 10/05/2021 None Seen  None Seen, Occasional /hpf Final   • Hyaline Casts, UA 10/05/2021 None Seen  None Seen /lpf Final   • EXT PREG TEST UR (Ref: Negative) 10/05/2021 negative   Final   • Control 10/05/2021 valid   Final   • WBC 10/05/2021 11 14 (H)  4 31 - 10 16 Thousand/uL Final   • RBC 10/05/2021 4 55  3 81 - 5 12 Million/uL Final   • Hemoglobin 10/05/2021 13 9  11 5 - 15 4 g/dL Final   • Hematocrit 10/05/2021 42 6  34 8 - 46 1 % Final   • MCV 10/05/2021 94  82 - 98 fL Final   • MCH 10/05/2021 30 5  26 8 - 34 3 pg Final   • MCHC 10/05/2021 32 6  31 4 - 37 4 g/dL Final   • RDW 10/05/2021 11 9  11 6 - 15 1 % Final   • MPV 10/05/2021 10 1  8 9 - 12 7 fL Final   • Platelets 37/88/6733 317  149 - 390 Thousands/uL Final   • nRBC 10/05/2021 0  /100 WBCs Final   • Neutrophils Relative 10/05/2021 66  43 - 75 % Final   • Immat GRANS % 10/05/2021 0  0 - 2 % Final   • Lymphocytes Relative 10/05/2021 27  14 - 44 % Final   • Monocytes Relative 10/05/2021 6  4 - 12 % Final   • Eosinophils Relative 10/05/2021 1  0 - 6 % Final   • Basophils Relative 10/05/2021 0  0 - 1 % Final   • Neutrophils Absolute 10/05/2021 7 38  1 85 - 7 62 Thousands/µL Final   • Immature Grans Absolute 10/05/2021 0 03  0 00 - 0 20 Thousand/uL Final   • Lymphocytes Absolute 10/05/2021 2 96  0 60 - 4 47 Thousands/µL Final   • Monocytes Absolute 10/05/2021 0 66  0 17 - 1 22 Thousand/µL Final   • Eosinophils Absolute 10/05/2021 0 07  0 00 - 0 61 Thousand/µL Final   • Basophils Absolute 10/05/2021 0 04  0 00 - 0 10 Thousands/µL Final   • Sodium 10/05/2021 136  136 - 145 mmol/L Final   • Potassium 10/05/2021 3 6  3 5 - 5 3 mmol/L Final   • Chloride 10/05/2021 104  100 - 108 mmol/L Final   • CO2 10/05/2021 27  21 - 32 mmol/L Final   • ANION GAP 10/05/2021 5  4 - 13 mmol/L Final   • BUN 10/05/2021 12  5 - 25 mg/dL Final   • Creatinine 10/05/2021 0 66  0 60 - 1 30 mg/dL Final    Standardized to IDMS reference method   • Glucose 10/05/2021 88  65 - 140 mg/dL Final    If the patient is fasting, the ADA then defines impaired fasting glucose as > 100 mg/dL and diabetes as > or equal to 123 mg/dL  Specimen collection should occur prior to Sulfasalazine administration due to the potential for falsely depressed results   Specimen collection should occur prior to Sulfapyridine administration due to the potential for falsely elevated results  • Calcium 10/05/2021 10 0  8 3 - 10 1 mg/dL Final   • AST 10/05/2021 34  5 - 45 U/L Final    Specimen collection should occur prior to Sulfasalazine administration due to the potential for falsely depressed results  • ALT 10/05/2021 64  12 - 78 U/L Final    Specimen collection should occur prior to Sulfasalazine and/or Sulfapyridine administration due to the potential for falsely depressed results  • Alkaline Phosphatase 10/05/2021 82  46 - 116 U/L Final   • Total Protein 10/05/2021 8 6 (H)  6 4 - 8 2 g/dL Final   • Albumin 10/05/2021 4 3  3 5 - 5 0 g/dL Final   • Total Bilirubin 10/05/2021 0 51  0 20 - 1 00 mg/dL Final    Use of this assay is not recommended for patients undergoing treatment with eltrombopag due to the potential for falsely elevated results     • eGFR 10/05/2021 120  ml/min/1 73sq m Final   • Lipase 10/05/2021 63 (L)  73 - 393 u/L Final

## 2023-02-13 ENCOUNTER — VBI (OUTPATIENT)
Dept: ADMINISTRATIVE | Facility: OTHER | Age: 33
End: 2023-02-13

## 2023-07-17 ENCOUNTER — HOSPITAL ENCOUNTER (EMERGENCY)
Facility: HOSPITAL | Age: 33
Discharge: HOME | End: 2023-07-17
Attending: EMERGENCY MEDICINE

## 2023-07-17 ENCOUNTER — APPOINTMENT (EMERGENCY)
Dept: CT IMAGING | Facility: HOSPITAL | Age: 33
End: 2023-07-17

## 2023-07-17 ENCOUNTER — APPOINTMENT (EMERGENCY)
Dept: RADIOLOGY | Facility: HOSPITAL | Age: 33
End: 2023-07-17

## 2023-07-17 VITALS
RESPIRATION RATE: 18 BRPM | HEART RATE: 81 BPM | BODY MASS INDEX: 32.74 KG/M2 | WEIGHT: 191.8 LBS | DIASTOLIC BLOOD PRESSURE: 65 MMHG | HEIGHT: 64 IN | SYSTOLIC BLOOD PRESSURE: 119 MMHG | TEMPERATURE: 98.2 F | OXYGEN SATURATION: 95 %

## 2023-07-17 DIAGNOSIS — R42 DIZZINESS: Primary | ICD-10-CM

## 2023-07-17 DIAGNOSIS — R07.89 CHEST DISCOMFORT: ICD-10-CM

## 2023-07-17 DIAGNOSIS — R79.89 ELEVATED D-DIMER: ICD-10-CM

## 2023-07-17 DIAGNOSIS — R91.1 LUNG NODULE: ICD-10-CM

## 2023-07-17 LAB
ANION GAP SERPL CALCULATED.3IONS-SCNC: 6 MMOL/L
ATRIAL RATE: 76 BPM
ATRIAL RATE: 89 BPM
BASOPHILS # BLD AUTO: 0.04 THOUSANDS/ÂΜL (ref 0–0.1)
BASOPHILS NFR BLD AUTO: 1 % (ref 0–1)
BILIRUB UR QL STRIP: NEGATIVE
BUN SERPL-MCNC: 14 MG/DL (ref 5–25)
CALCIUM SERPL-MCNC: 9.1 MG/DL (ref 8.4–10.2)
CARDIAC TROPONIN I PNL SERPL HS: <2 NG/L
CARDIAC TROPONIN I PNL SERPL HS: <2 NG/L
CHLORIDE SERPL-SCNC: 106 MMOL/L (ref 96–108)
CLARITY UR: CLEAR
CO2 SERPL-SCNC: 24 MMOL/L (ref 21–32)
COLOR UR: YELLOW
CREAT SERPL-MCNC: 0.64 MG/DL (ref 0.6–1.3)
D DIMER PPP FEU-MCNC: 2.3 UG/ML FEU
EOSINOPHIL # BLD AUTO: 0.1 THOUSAND/ÂΜL (ref 0–0.61)
EOSINOPHIL NFR BLD AUTO: 1 % (ref 0–6)
ERYTHROCYTE [DISTWIDTH] IN BLOOD BY AUTOMATED COUNT: 11.9 % (ref 11.6–15.1)
EXT PREGNANCY TEST URINE: NEGATIVE
EXT. CONTROL: NORMAL
GFR SERPL CREATININE-BSD FRML MDRD: 119 ML/MIN/1.73SQ M
GLUCOSE SERPL-MCNC: 100 MG/DL (ref 65–140)
GLUCOSE UR STRIP-MCNC: NEGATIVE MG/DL
HCT VFR BLD AUTO: 40.1 % (ref 34.8–46.1)
HGB BLD-MCNC: 13.1 G/DL (ref 11.5–15.4)
HGB UR QL STRIP.AUTO: NEGATIVE
IMM GRANULOCYTES # BLD AUTO: 0.05 THOUSAND/UL (ref 0–0.2)
IMM GRANULOCYTES NFR BLD AUTO: 1 % (ref 0–2)
KETONES UR STRIP-MCNC: NEGATIVE MG/DL
LEUKOCYTE ESTERASE UR QL STRIP: NEGATIVE
LYMPHOCYTES # BLD AUTO: 2.89 THOUSANDS/ÂΜL (ref 0.6–4.47)
LYMPHOCYTES NFR BLD AUTO: 34 % (ref 14–44)
MCH RBC QN AUTO: 30.8 PG (ref 26.8–34.3)
MCHC RBC AUTO-ENTMCNC: 32.7 G/DL (ref 31.4–37.4)
MCV RBC AUTO: 94 FL (ref 82–98)
MONOCYTES # BLD AUTO: 0.74 THOUSAND/ÂΜL (ref 0.17–1.22)
MONOCYTES NFR BLD AUTO: 9 % (ref 4–12)
NEUTROPHILS # BLD AUTO: 4.63 THOUSANDS/ÂΜL (ref 1.85–7.62)
NEUTS SEG NFR BLD AUTO: 54 % (ref 43–75)
NITRITE UR QL STRIP: NEGATIVE
NRBC BLD AUTO-RTO: 0 /100 WBCS
P AXIS: 44 DEGREES
P AXIS: 46 DEGREES
PH UR STRIP.AUTO: 5.5 [PH] (ref 4.5–8)
PLATELET # BLD AUTO: 239 THOUSANDS/UL (ref 149–390)
PMV BLD AUTO: 9.9 FL (ref 8.9–12.7)
POTASSIUM SERPL-SCNC: 3.7 MMOL/L (ref 3.5–5.3)
PR INTERVAL: 116 MS
PR INTERVAL: 126 MS
PROT UR STRIP-MCNC: NEGATIVE MG/DL
QRS AXIS: 52 DEGREES
QRS AXIS: 63 DEGREES
QRSD INTERVAL: 80 MS
QRSD INTERVAL: 84 MS
QT INTERVAL: 366 MS
QT INTERVAL: 368 MS
QTC INTERVAL: 414 MS
QTC INTERVAL: 445 MS
RBC # BLD AUTO: 4.26 MILLION/UL (ref 3.81–5.12)
SODIUM SERPL-SCNC: 136 MMOL/L (ref 135–147)
SP GR UR STRIP.AUTO: 1.02 (ref 1–1.03)
T WAVE AXIS: 14 DEGREES
T WAVE AXIS: 5 DEGREES
UROBILINOGEN UR QL STRIP.AUTO: 0.2 E.U./DL
VENTRICULAR RATE: 76 BPM
VENTRICULAR RATE: 89 BPM
WBC # BLD AUTO: 8.45 THOUSAND/UL (ref 4.31–10.16)

## 2023-07-17 PROCEDURE — 84484 ASSAY OF TROPONIN QUANT: CPT | Performed by: PHYSICIAN ASSISTANT

## 2023-07-17 PROCEDURE — 71046 X-RAY EXAM CHEST 2 VIEWS: CPT

## 2023-07-17 PROCEDURE — 85025 COMPLETE CBC W/AUTO DIFF WBC: CPT | Performed by: PHYSICIAN ASSISTANT

## 2023-07-17 PROCEDURE — 85379 FIBRIN DEGRADATION QUANT: CPT | Performed by: PHYSICIAN ASSISTANT

## 2023-07-17 PROCEDURE — 71275 CT ANGIOGRAPHY CHEST: CPT

## 2023-07-17 PROCEDURE — 93010 ELECTROCARDIOGRAM REPORT: CPT | Performed by: INTERNAL MEDICINE

## 2023-07-17 PROCEDURE — G1004 CDSM NDSC: HCPCS

## 2023-07-17 PROCEDURE — 81025 URINE PREGNANCY TEST: CPT | Performed by: PHYSICIAN ASSISTANT

## 2023-07-17 PROCEDURE — 80048 BASIC METABOLIC PNL TOTAL CA: CPT | Performed by: PHYSICIAN ASSISTANT

## 2023-07-17 PROCEDURE — 93010 ELECTROCARDIOGRAM REPORT: CPT | Performed by: STUDENT IN AN ORGANIZED HEALTH CARE EDUCATION/TRAINING PROGRAM

## 2023-07-17 PROCEDURE — 36415 COLL VENOUS BLD VENIPUNCTURE: CPT | Performed by: PHYSICIAN ASSISTANT

## 2023-07-17 PROCEDURE — 93005 ELECTROCARDIOGRAM TRACING: CPT

## 2023-07-17 PROCEDURE — 99285 EMERGENCY DEPT VISIT HI MDM: CPT

## 2023-07-17 PROCEDURE — 81003 URINALYSIS AUTO W/O SCOPE: CPT

## 2023-07-17 RX ADMIN — IOHEXOL 85 ML: 350 INJECTION, SOLUTION INTRAVENOUS at 14:19

## 2023-07-17 NOTE — ED PROVIDER NOTES
History  Chief Complaint   Patient presents with   • Dizziness     Pt arrives via EMS from work, had episode of dizziness, feeling shaky/jittery, chest discomfort. Upon EMS arrival pt with increased HR and RR. Episode lasted approx 30 minutes. Hx of anxiety, but usually can work through it, denies any recent stressors. Symptoms improving but not resolved.      33y. o female with PMH of anxiety and psoriasis presents to the ER for evaluation. Patient states she was at work when she began to feel dizzy and shaky with chest discomfort. Patient states symptoms lasted about 30 minutes. She reports feeling much better at this time. Patient reports having a history of anxiety but states this feels much worse then her normal anxiety. She denies taking any medication for symptoms. She denies symptoms currently. She denies fever, chills, URI symptoms, dyspnea, N/V/D, abdominal pain, weakness or paresthesias. She does have a mirena in place. She denies other PE risk factors. She denies a personal cardiac history but reports her grandfather has CAD. History provided by:  Patient   used: No        Prior to Admission Medications   Prescriptions Last Dose Informant Patient Reported? Taking?    Apremilast 10 & 20 & 30 MG TBPK   No No   Sig: Use as directed   Apremilast 30 MG TABS   No No   Sig: Take 1 tablet by mouth 2 (two) times a day   acetaminophen (TYLENOL) 500 mg tablet  Self Yes No   Sig: Take 500 mg by mouth every 6 (six) hours as needed for mild pain   acetaminophen (TYLENOL) 500 mg tablet   No No   Sig: Take 2 tablets (1,000 mg total) by mouth every 8 (eight) hours as needed for mild pain   dextromethorphan-guaiFENesin (ROBITUSSIN DM)  mg/5 mL syrup   No No   Sig: Take 5 mL by mouth 3 (three) times a day as needed for cough   Patient not taking: Reported on 5/2/2022    guaiFENesin (MUCINEX) 600 mg 12 hr tablet   No No   Sig: Take 2 tablets (1,200 mg total) by mouth every 12 (twelve) hours Patient not taking: Reported on 2022    hydroxychloroquine (PLAQUENIL) 200 mg tablet   No No   Sig: Take 1 tablet (200 mg total) by mouth 2 (two) times a day with meals   ibuprofen (MOTRIN) 200 mg tablet  Self Yes No   Sig: Take by mouth every 6 (six) hours as needed for mild pain   Patient not taking: Reported on 2022   ibuprofen (MOTRIN) 200 mg tablet   No No   Sig: Take 2 tablets (400 mg total) by mouth every 6 (six) hours as needed for mild pain   Patient not taking: Reported on 2022   levonorgestrel (MIRENA, 52 MG,) 20 MCG/24HR IUD   Yes No   Sig: by Intrauterine route   meloxicam (MOBIC) 15 mg tablet   Yes No   naproxen (NAPROSYN) 250 mg tablet   Yes No   Sig: Take 250 mg by mouth 2 (two) times a day with meals   Patient not taking: Reported on 2022    oxymetazoline (AFRIN) 0.05 % nasal spray   No No   Si sprays by Each Nare route 2 (two) times a day Do NOT use longer than 5 days   Patient not taking: Reported on 2022    triamcinolone (KENALOG) 0.1 % ointment   No No   Sig: Apply topically 2 (two) times a day      Facility-Administered Medications: None       Past Medical History:   Diagnosis Date   • Psoriasis        Past Surgical History:   Procedure Laterality Date   • CHOLECYSTECTOMY     • GALLBLADDER SURGERY     • WISDOM TOOTH EXTRACTION         Family History   Problem Relation Age of Onset   • Hypertension Mother    • Arthritis Mother    • Asthma Mother    • Thyroid disease unspecified Mother    • No Known Problems Sister    • No Known Problems Brother    • No Known Problems Daughter    • Diabetes Maternal Grandmother    • Thyroid disease unspecified Maternal Grandmother    • Other Maternal Grandfather         heart problems   • No Known Problems Sister    • No Known Problems Brother    • Asthma Family      I have reviewed and agree with the history as documented.     E-Cigarette/Vaping     E-Cigarette/Vaping Substances     Social History     Tobacco Use   • Smoking status: Former   • Smokeless tobacco: Never   Substance Use Topics   • Alcohol use: Yes     Comment: Socially   • Drug use: No       Review of Systems   Constitutional: Negative for activity change, appetite change, chills and fever. HENT: Negative for congestion, drooling, ear discharge, ear pain, facial swelling, rhinorrhea and sore throat. Eyes: Negative for redness. Respiratory: Negative for cough and shortness of breath. Cardiovascular: Positive for chest pain. Gastrointestinal: Negative for abdominal pain, diarrhea, nausea and vomiting. Musculoskeletal: Negative for neck stiffness. Skin: Negative for rash. Allergic/Immunologic: Negative for food allergies. Neurological: Positive for dizziness. Negative for weakness and numbness. Physical Exam  Physical Exam  Vitals and nursing note reviewed. Constitutional:       General: She is not in acute distress. Appearance: She is not toxic-appearing. HENT:      Head: Normocephalic and atraumatic. Right Ear: Tympanic membrane, ear canal and external ear normal. No drainage, swelling or tenderness. No foreign body. No hemotympanum. Tympanic membrane is not erythematous. Left Ear: Tympanic membrane, ear canal and external ear normal. No drainage, swelling or tenderness. No foreign body. No hemotympanum. Tympanic membrane is not erythematous. Nose: Nose normal.      Mouth/Throat:      Lips: Pink. No lesions. Mouth: Mucous membranes are moist.      Pharynx: Oropharynx is clear. Uvula midline. No pharyngeal swelling, oropharyngeal exudate, posterior oropharyngeal erythema or uvula swelling. Tonsils: No tonsillar exudate or tonsillar abscesses. Eyes:      Extraocular Movements: Extraocular movements intact. Conjunctiva/sclera: Conjunctivae normal.      Pupils: Pupils are equal, round, and reactive to light. Neck:      Trachea: Phonation normal. No tracheal deviation.    Cardiovascular:      Rate and Rhythm: Normal rate and regular rhythm. Heart sounds: Normal heart sounds, S1 normal and S2 normal. No murmur heard. No friction rub. No gallop. Pulmonary:      Effort: Pulmonary effort is normal. No respiratory distress. Breath sounds: Normal breath sounds. No decreased breath sounds, wheezing, rhonchi or rales. Chest:      Chest wall: No tenderness. Abdominal:      General: There is no distension. Musculoskeletal:         General: No deformity. Normal range of motion. Cervical back: Normal range of motion and neck supple. Thoracic back: Normal.      Lumbar back: Normal.   Skin:     General: Skin is warm and dry. Findings: No rash. Neurological:      Mental Status: She is alert. GCS: GCS eye subscore is 4. GCS verbal subscore is 5. GCS motor subscore is 6. Cranial Nerves: No cranial nerve deficit, dysarthria or facial asymmetry. Sensory: No sensory deficit. Motor: No weakness, tremor, abnormal muscle tone or seizure activity.       Gait: Gait normal.   Psychiatric:         Mood and Affect: Mood normal.         Vital Signs  ED Triage Vitals   Temperature Pulse Respirations Blood Pressure SpO2   07/17/23 1115 07/17/23 1115 07/17/23 1115 07/17/23 1115 07/17/23 1115   98.2 °F (36.8 °C) 65 15 135/81 98 %      Temp Source Heart Rate Source Patient Position - Orthostatic VS BP Location FiO2 (%)   07/17/23 1115 07/17/23 1242 07/17/23 1242 07/17/23 1242 --   Oral Monitor Lying Right arm       Pain Score       07/17/23 1115       No Pain           Vitals:    07/17/23 1115 07/17/23 1242 07/17/23 1500   BP: 135/81 125/74 119/65   Pulse: 65 79 81   Patient Position - Orthostatic VS:  Lying Lying         Visual Acuity      ED Medications  Medications   iohexol (OMNIPAQUE) 350 MG/ML injection (SINGLE-DOSE) 85 mL (85 mL Intravenous Given 7/17/23 1419)       Diagnostic Studies  Results Reviewed     Procedure Component Value Units Date/Time    HS Troponin I 2hr [[id number]] Collected: 07/17/23 1427    Lab Status: Final result Specimen: Blood from Arm, Left Updated: 07/17/23 1455     hs TnI 2hr <2 ng/L      Delta 2hr hsTnI --    HS Troponin 0hr (reflex protocol) [[id number]]  (Normal) Collected: 07/17/23 1216    Lab Status: Final result Specimen: Blood from Arm, Left Updated: 07/17/23 1257     hs TnI 0hr <2 ng/L     D-Dimer [[id number]]  (Abnormal) Collected: 07/17/23 1216    Lab Status: Final result Specimen: Blood from Arm, Left Updated: 07/17/23 1252     D-Dimer, Quant 2.30 ug/ml FEU     Basic metabolic panel [[id number]] Collected: 07/17/23 1216    Lab Status: Final result Specimen: Blood from Arm, Left Updated: 07/17/23 1252     Sodium 136 mmol/L      Potassium 3.7 mmol/L      Chloride 106 mmol/L      CO2 24 mmol/L      ANION GAP 6 mmol/L      BUN 14 mg/dL      Creatinine 0.64 mg/dL      Glucose 100 mg/dL      Calcium 9.1 mg/dL      eGFR 119 ml/min/1.73sq m     Narrative:      [place] guidelines for Chronic Kidney Disease (CKD):   •  Stage 1 with normal or high GFR (GFR > 90 mL/min/1.73 square meters)  •  Stage 2 Mild CKD (GFR = 60-89 mL/min/1.73 square meters)  •  Stage 3A Moderate CKD (GFR = 45-59 mL/min/1.73 square meters)  •  Stage 3B Moderate CKD (GFR = 30-44 mL/min/1.73 square meters)  •  Stage 4 Severe CKD (GFR = 15-29 mL/min/1.73 square meters)  •  Stage 5 End Stage CKD (GFR <15 mL/min/1.73 square meters)  Note: GFR calculation is accurate only with a steady state creatinine    CBC and differential [[id number]] Collected: 07/17/23 1216    Lab Status: Final result Specimen: Blood from Arm, Left Updated: 07/17/23 1223     WBC 8.45 Thousand/uL      RBC 4.26 Million/uL      Hemoglobin 13.1 g/dL      Hematocrit 40.1 %      MCV 94 fL      MCH 30.8 pg      MCHC 32.7 g/dL      RDW 11.9 %      MPV 9.9 fL      Platelets 121 Thousands/uL      nRBC 0 /100 WBCs      Neutrophils Relative 54 %      Immat GRANS % 1 %      Lymphocytes Relative 34 %      Monocytes Relative 9 % Eosinophils Relative 1 %      Basophils Relative 1 %      Neutrophils Absolute 4.63 Thousands/µL      Immature Grans Absolute 0.05 Thousand/uL      Lymphocytes Absolute 2.89 Thousands/µL      Monocytes Absolute 0.74 Thousand/µL      Eosinophils Absolute 0.10 Thousand/µL      Basophils Absolute 0.04 Thousands/µL     POCT pregnancy, urine [[id number]]  (Normal) Resulted: 07/17/23 1215    Lab Status: Final result Updated: 07/17/23 1215     EXT Preg Test, Ur Negative     Control Valid    Urine Macroscopic, POC [[id number]] Collected: 07/17/23 1213    Lab Status: Final result Specimen: Urine Updated: 07/17/23 1214     Color, UA Yellow     Clarity, UA Clear     pH, UA 5.5     Leukocytes, UA Negative     Nitrite, UA Negative     Protein, UA Negative mg/dl      Glucose, UA Negative mg/dl      Ketones, UA Negative mg/dl      Urobilinogen, UA 0.2 E.U./dl      Bilirubin, UA Negative     Occult Blood, UA Negative     Specific Gravity, UA 1.020    Narrative:      CLINITEK RESULT                 CTA ED chest PE study   Final Result by Osbaldo Llanes MD (07/17 5733)      1. No acute pulmonary embolism. 2.  3 mm lingular nodule. Based on current Fleischner Society 2017 Guidelines on incidental pulmonary nodule, in this patient who is less than 39years of age, management decisions should be made on a case by case basis, keeping in mind that infectious    causes are more likely than cancer and that use of serial CT should be minimized. Workstation performed: [id number]         XR chest 2 views   Final Result by Justin Carrillo MD (07/17 2169)      No acute cardiopulmonary disease.       Findings are stable            Workstation performed: [id number]                    Procedures  ECG 12 Lead Documentation Only    Date/Time: 7/17/2023 11:21 AM    Performed by: Maribel Moran PA-C  Authorized by: Maribel Moran PA-C    Indications / Diagnosis:  Dizziness  ECG reviewed by me, the ED Provider: yes (Also reviewed by Dr. Camila Murrieta)    Patient location:  ED  Previous ECG:     Previous ECG:  Compared to current    Comparison ECG info:  T wave inversion in lead III and V3    Similarity:  Changes noted  Interpretation:     Interpretation: abnormal    Rate:     ECG rate:  76    ECG rate assessment: normal    Rhythm:     Rhythm: sinus rhythm    Ectopy:     Ectopy: none    QRS:     QRS intervals:  Normal  ST segments:     ST segments:  Normal  T waves:     T waves: inverted      Inverted:  III, aVR, V1 and V3    ECG 12 Lead Documentation Only    Date/Time: 7/17/2023 2:49 PM    Performed by: Luz Olsen PA-C  Authorized by: Luz Olsen PA-C    Indications / Diagnosis:  Delta ekg  ECG reviewed by me, the ED Provider: yes (Also reviewed by Dr. Mini Doan)    Patient location:  ED  Previous ECG:     Previous ECG:  Compared to current    Comparison ECG info:  Flipped t waves in V3    Similarity:  Changes noted  Interpretation:     Interpretation: abnormal    Rate:     ECG rate:  89    ECG rate assessment: normal    Rhythm:     Rhythm: sinus rhythm    Ectopy:     Ectopy: none    QRS:     QRS intervals:  Normal  ST segments:     ST segments:  Normal  T waves:     T waves: inverted      Inverted:  III, aVR and V1             ED Course  ED Course as of 07/17/23 1601   Mon Jul 17, 2023   1215 Urine Macroscopic, POC  Normal.   1218 PREGNANCY TEST URINE: Negative   1232 WBC: 8.45   1232 Hemoglobin: 13.1   1232 Platelet Count: 256   1312 hs TnI 0hr: <2   1312 D-Dimer, Quant(!): 2.30   1851 Basic metabolic panel   0704 CTA ED chest PE study  1. No acute pulmonary embolism.     2.  3 mm lingular nodule. Based on current Fleischner Society 2017 Guidelines on incidental pulmonary nodule, in this patient who is less than 39years of age, management decisions should be made on a case by case basis, keeping in mind that infectious   causes are more likely than cancer and that use of serial CT should be minimized. HEART Risk Score    Flowsheet Row Most Recent Value   Heart Score Risk Calculator    History 0 Filed at: 07/17/2023 1513   ECG 1 Filed at: 07/17/2023 1513   Age 0 Filed at: 07/17/2023 1513   Risk Factors 1 Filed at: 07/17/2023 1513   Troponin 0 Filed at: 07/17/2023 1513   HEART Score 2 Filed at: 07/17/2023 1513                PERC Rule for PE    Flowsheet Row Most Recent Value   PERC Rule for PE    Age >=50 0 Filed at: 07/17/2023 1513   HR >=100 0 Filed at: 07/17/2023 1513   O2 Sat on room air < 95% 0 Filed at: 07/17/2023 1513   History of PE or DVT 0 Filed at: 07/17/2023 1513   Recent trauma or surgery 0 Filed at: 07/17/2023 1513   Hemoptysis 0 Filed at: 07/17/2023 1513   Exogenous estrogen 1 Filed at: 07/17/2023 1513   Unilateral leg swelling 0 Filed at: 07/17/2023 1513   PERC Rule for PE Results 1 Filed at: 07/17/2023 1513              SBIRT 20yo+    Flowsheet Row Most Recent Value   Initial Alcohol Screen: US AUDIT-C     1. How often do you have a drink containing alcohol? 1 Filed at: 07/17/2023 1116   2. How many drinks containing alcohol do you have on a typical day you are drinking? 2 Filed at: 07/17/2023 1116   3a. Male UNDER 65: How often do you have five or more drinks on one occasion? 0 Filed at: 07/17/2023 1116   3b. FEMALE Any Age, or MALE 65+: How often do you have 4 or more drinks on one occassion? 0 Filed at: 07/17/2023 1116   Audit-C Score 3 Filed at: 07/17/2023 1116   STONEY: How many times in the past year have you. .. Used an illegal drug or used a prescription medication for non-medical reasons?  Never Filed at: 07/17/2023 1116          Wells' Criteria for PE    Flowsheet Row Most Recent Value   Wells' Criteria for PE    Clinical signs and symptoms of DVT 0 Filed at: 07/17/2023 1513   PE is primary diagnosis or equally likely 0 Filed at: 07/17/2023 1513   HR >100 0 Filed at: 07/17/2023 1513   Immobilization at least 3 days or Surgery in the previous 4 weeks 0 Filed at: 07/17/2023 200   Previous, objectively diagnosed PE or DVT 0 Filed at: 07/17/2023 1513   Hemoptysis 0 Filed at: 07/17/2023 1513   Malignancy with treatment within 6 months or palliative 0 Filed at: 07/17/2023 1513   Wells' Criteria Total 0 Filed at: 07/17/2023 1513                Medical Decision Making  33y. o female presents to the ER for dizziness, shakiness and chest discomfort. Vitals are stable. Patient is in no acute distress. On exam, pupils are equal and reactive. EOM intact. No signs of ear or throat infection. Normal rise with phonation. No neck swelling. Lungs are clear. Heart is regular rate and rhythm. Abdomen is not distended. No spinal tenderness to palpation. Patient neurologically intact. Will check labs and imaging. 1215 Urine Macroscopic, POC - Normal.    1218 PREGNANCY TEST URINE: Negative    1232 WBC: 8.45    1232 Hemoglobin: 13.1    1232 Platelet Count: 614    1312 hs TnI 0hr: <2    4249 D-Dimer, Quant(!): 2.30 - Will check CTA PE study to ensure patient does not have PE especially with symptoms and the fact that she does take Estrogen. 8198 Basic metabolic panel    4989 CTA ED chest PE study  1. No acute pulmonary embolism.     2.  3 mm lingular nodule. Based on current Fleischner Society 2017 Guidelines on incidental pulmonary nodule, in this patient who is less than 39years of age, management decisions should be made on a case by case basis, keeping in mind that infectious   causes are more likely than cancer and that use of serial CT should be minimized. 1513     Informed patient of lab and imaging findings. Patient reports feeling much better. Will discharge. Patient agreeable. The management plan was discussed in detail with the patient at bedside and all questions were answered. Prior to discharge, we provided both verbal and written instructions. We discussed with the patient the signs and symptoms for which to return to the emergency department.   All questions were answered and patient was comfortable with the plan of care and discharged to home. Instructed the patient to follow up with the primary care provider and/or specialist provided and their written instructions. The patient verbalized understanding of our discussion and plan of care, and agrees to return to the Emergency Department for concerns and progression of illness. At discharge, I instructed the patient to:  -follow up with pcp  -rest and drink plenty of fluids  -return to the ER if symptoms worsened or new symptoms arose  Patient agreed to this plan and was stable at time of discharge. Chest discomfort: acute illness or injury  Dizziness: acute illness or injury  Elevated d-dimer: acute illness or injury  Lung nodule: acute illness or injury  Amount and/or Complexity of Data Reviewed  Independent Historian:      Details: Patient is historian  Labs: ordered. Decision-making details documented in ED Course. Radiology: ordered. Decision-making details documented in ED Course. ECG/medicine tests: ordered and independent interpretation performed. Risk  Prescription drug management. Disposition  Final diagnoses:   Dizziness   Chest discomfort   Elevated d-dimer   Lung nodule     Time reflects when diagnosis was documented in both MDM as applicable and the Disposition within this note     Time User Action Codes Description Comment    7/17/2023  3:13 PM Aloma Alderman A Add [R42] Dizziness     7/17/2023  3:13 PM Eric Margarito Add [R07.89] Chest discomfort     7/17/2023  3:13 PM Aloma Alderman A Add [R79.89] Elevated d-dimer     7/17/2023  3:13 PM Aloma Alderman A Add [R91.1] Lung nodule       ED Disposition     ED Disposition   Discharge    Condition   Stable    Date/Time   Mon Jul 17, 2023  3:12 PM    Comment   Delayne Neighbours discharge to home/self care.                Follow-up Information     Follow up With Specialties Details Why [address] , [facility] Schedule an appointment as soon as possible for a visit  As needed [address]  [address] [place] [id number]  [phone]            Discharge Medication List as of 7/17/2023  3:14 PM      CONTINUE these medications which have NOT CHANGED    Details   !! acetaminophen (TYLENOL) 500 mg tablet Take 500 mg by mouth every 6 (six) hours as needed for mild pain, Historical Med      !! acetaminophen (TYLENOL) 500 mg tablet Take 2 tablets (1,000 mg total) by mouth every 8 (eight) hours as needed for mild pain, Starting Fri 12/17/2021, Normal      Apremilast 10 & 20 & 30 MG TBPK Use as directed, Normal      Apremilast 30 MG TABS Take 1 tablet by mouth 2 (two) times a day, Starting Wed 12/14/2022, Normal      dextromethorphan-guaiFENesin (ROBITUSSIN DM)  mg/5 mL syrup Take 5 mL by mouth 3 (three) times a day as needed for cough, Starting Fri 12/17/2021, Normal      guaiFENesin (MUCINEX) 600 mg 12 hr tablet Take 2 tablets (1,200 mg total) by mouth every 12 (twelve) hours, Starting Fri 12/17/2021, Normal      hydroxychloroquine (PLAQUENIL) 200 mg tablet Take 1 tablet (200 mg total) by mouth 2 (two) times a day with meals, Starting Wed 5/5/2021, Until Fri 6/4/2021, Normal      !! ibuprofen (MOTRIN) 200 mg tablet Take by mouth every 6 (six) hours as needed for mild pain, Historical Med      !! ibuprofen (MOTRIN) 200 mg tablet Take 2 tablets (400 mg total) by mouth every 6 (six) hours as needed for mild pain, Starting Fri 12/17/2021, Normal      levonorgestrel (MIRENA, 52 MG,) 20 MCG/24HR IUD by Intrauterine route, Historical Med      meloxicam (MOBIC) 15 mg tablet Starting Thu 11/10/2022, Historical Med      naproxen (NAPROSYN) 250 mg tablet Take 250 mg by mouth 2 (two) times a day with meals, Historical Med      oxymetazoline (AFRIN) 0.05 % nasal spray 2 sprays by Each Nare route 2 (two) times a day Do NOT use longer than 5 days, Starting Fri 12/17/2021, Normal      triamcinolone (KENALOG) 0.1 % ointment Apply topically 2 (two) times a day, Starting Fri 2/26/2021, Normal       !! - Potential duplicate medications found. Please discuss with provider. No discharge procedures on file.     PDMP Review     None          ED Provider  Electronically Signed by           Robertha Frankel, PA-C  07/17/23 9030

## 2023-07-17 NOTE — DISCHARGE INSTRUCTIONS
DISCHARGE INSTRUCTIONS:    FOLLOW UP WITH YOUR PRIMARY CARE PROVIDER OR THE [address] MAKE AN APPOINTMENT TO BE SEEN. REST AND DRINK PLENTY OF FLUIDS. IF SYMPTOMS WORSEN OR NEW SYMPTOMS ARISE, RETURN TO THE ER TO BE SEEN.

## 2024-02-21 PROBLEM — Z11.3 ROUTINE SCREENING FOR STI (SEXUALLY TRANSMITTED INFECTION): Status: RESOLVED | Noted: 2018-10-22 | Resolved: 2024-02-21

## 2024-02-21 PROBLEM — Z00.00 WELL ADULT EXAM: Status: RESOLVED | Noted: 2018-10-30 | Resolved: 2024-02-21

## 2024-06-20 ENCOUNTER — TELEPHONE (OUTPATIENT)
Dept: PSYCHIATRY | Facility: CLINIC | Age: 34
End: 2024-06-20

## 2024-06-20 NOTE — TELEPHONE ENCOUNTER
Patient has been added to the Talk Therapy wait list without a referral.    Insurance: [employer]  Insurance Type:    Commercial []   Medicaid [x]   [facility] (if applicable)   Medicare []  Location Preference: [place]  Provider Preference: Female  Virtual: Yes [] No [x]  Were outside resources sent: Yes [] No [x]    Present problem: Someone to talk to.

## 2024-10-16 ENCOUNTER — TELEPHONE (OUTPATIENT)
Age: 34
End: 2024-10-16